# Patient Record
Sex: FEMALE | Race: WHITE | NOT HISPANIC OR LATINO | Employment: UNEMPLOYED | URBAN - NONMETROPOLITAN AREA
[De-identification: names, ages, dates, MRNs, and addresses within clinical notes are randomized per-mention and may not be internally consistent; named-entity substitution may affect disease eponyms.]

---

## 2017-02-01 ENCOUNTER — OFFICE VISIT (OUTPATIENT)
Dept: PSYCHIATRY | Facility: CLINIC | Age: 32
End: 2017-02-01

## 2017-02-01 DIAGNOSIS — F19.20 MEDICATION ADDICTION, CONTINUOUS (HCC): ICD-10-CM

## 2017-02-01 DIAGNOSIS — F17.200 TOBACCO USE DISORDER: ICD-10-CM

## 2017-02-01 DIAGNOSIS — F10.11 ALCOHOL ABUSE, IN REMISSION: ICD-10-CM

## 2017-02-01 DIAGNOSIS — F19.10 SUBSTANCE ABUSE (HCC): ICD-10-CM

## 2017-02-01 DIAGNOSIS — F12.10 MILD CANNABIS USE DISORDER: ICD-10-CM

## 2017-02-01 DIAGNOSIS — F11.20 OPIOID USE DISORDER, MODERATE, DEPENDENCE (HCC): Primary | ICD-10-CM

## 2017-02-01 LAB
AMPHETAMINE CUT-OFF: ABNORMAL
BENZODIAZIPINE CUT-OFF: ABNORMAL
BUPRENORPHINE CUT-OFF: ABNORMAL
COCAINE CUT-OFF: ABNORMAL
EXTERNAL AMPHETAMINE SCREEN URINE: NEGATIVE
EXTERNAL BENZODIAZEPINE SCREEN URINE: NEGATIVE
EXTERNAL BUPRENORPHINE SCREEN URINE: POSITIVE
EXTERNAL COCAINE SCREEN URINE: NEGATIVE
EXTERNAL MDMA: NEGATIVE
EXTERNAL METHADONE SCREEN URINE: NEGATIVE
EXTERNAL METHAMPHETAMINE SCREEN URINE: NEGATIVE
EXTERNAL OPIATES SCREEN URINE: POSITIVE
EXTERNAL OXYCODONE SCREEN URINE: NEGATIVE
EXTERNAL THC SCREEN URINE: NEGATIVE
MDMA CUT-OFF: ABNORMAL
METHADONE CUT-OFF: ABNORMAL
METHAMPHETAMINE CUT-OFF: ABNORMAL
OPIATES CUT-OFF: ABNORMAL
OXYCODONE CUT-OFF: ABNORMAL
THC CUT-OFF: ABNORMAL

## 2017-02-01 PROCEDURE — 90837 PSYTX W PT 60 MINUTES: CPT | Performed by: COUNSELOR

## 2017-02-01 NOTE — TREATMENT PLAN
DATE: 02/01/17  Long Term Goal: Maintain sobriety and develop recovery skills to improve her level of functioning                                                                                                                                                                                Patient Care Needs Objectives Target Date Extend Date Date Met Interventions ResponsibleTeam Member    Relapse Risk: Patient presents with Opioid, Cannabis, and Medication Dependencies and is at high risk for relapse.  Patient has long history of substance use with minimal periods of abstinence.                                                                         1.Patient to display an increase in DASES score indicating increased confidence in her ability to maintain abstinence.    a.Identify high risk people, places, and situations that must be avoided or managed to prevent relapse.    b.Identify specific recovery action steps she can take when faced with high risk situations.    c.Attend group sessions as scheduled and participate by giving and receiving feedback.    d.Develop a positive network of support by attending a minimum of three 12 step support groups weekly and obtain a sponsor.    e.Write a personal recovery plan and share it with the group prior to discharge.    f.Identify healthy coping strategies to manage difficult emotions without .using drugs or alcohol.    g. Complete a Personal Journal exploring addiction and personal issues relating to use/relapse prior to discharge                   04/17/17   1a. Educate patient about the disease concept and relapse prevention skills.  b. Provide Narcotics Anonymous book and other recovery literature.  c. Provide random alcohol and drug screening.  d. IOP groups provided M,W, Th 8:30-11:30 AM.  e. Introduce patient to 12-step recovery groups and encourage her to get a sponsor.  f. Assist patient in the development of a personal recovery plan.  g. Teach cognitive  behavioral techniques to dispute irrational beliefs.  h. . Family is invited to attend family education sessions every Tuesday.  i. Provide progress reports as needed.                                       MD Signature      Date/ Time         Therapist Signature    Date/Time      RN Signature    Date/Time               Discharge Criteria Plan: Patient to complete treatment objectives while displaying regular attendance and negative drug/alcohol screens.     I have discussed and reviewed this treatment plan with the patient.  It has been printed for signatures.
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
## 2017-02-06 ENCOUNTER — LAB (OUTPATIENT)
Dept: LAB | Facility: HOSPITAL | Age: 32
End: 2017-02-06
Attending: PSYCHIATRY & NEUROLOGY

## 2017-02-06 ENCOUNTER — OFFICE VISIT (OUTPATIENT)
Dept: PSYCHIATRY | Facility: HOSPITAL | Age: 32
End: 2017-02-06

## 2017-02-06 DIAGNOSIS — F17.200 TOBACCO USE DISORDER: ICD-10-CM

## 2017-02-06 DIAGNOSIS — F10.10 ALCOHOL ABUSE, CONTINUOUS: ICD-10-CM

## 2017-02-06 DIAGNOSIS — F11.20 OPIOID TYPE DEPENDENCE, UNSPECIFIED: Primary | ICD-10-CM

## 2017-02-06 DIAGNOSIS — F12.10 MILD CANNABIS USE DISORDER: ICD-10-CM

## 2017-02-06 DIAGNOSIS — F11.20 OPIOID USE DISORDER, MODERATE, DEPENDENCE (HCC): Primary | ICD-10-CM

## 2017-02-06 DIAGNOSIS — F10.11 ALCOHOL ABUSE, IN REMISSION: ICD-10-CM

## 2017-02-06 DIAGNOSIS — F11.20 OPIOID TYPE DEPENDENCE, UNSPECIFIED: ICD-10-CM

## 2017-02-06 DIAGNOSIS — F12.10 CANNABIS ABUSE, UNSPECIFIED: ICD-10-CM

## 2017-02-06 LAB
AMPHET+METHAMPHET UR QL: NEGATIVE
BARBITURATES UR QL SCN: NEGATIVE
BENZODIAZ UR QL SCN: NEGATIVE
BUPRENORPHINE+NOR UR QL SCN: NEGATIVE
CANNABINOIDS SERPL QL: NEGATIVE
COCAINE UR QL: NEGATIVE
METHADONE UR QL SCN: NEGATIVE
OPIATES UR QL: NEGATIVE
OXYCODONE UR QL SCN: NEGATIVE
PCP UR QL SCN: NEGATIVE
PROPOXYPH UR QL: NEGATIVE

## 2017-02-06 PROCEDURE — 80307 DRUG TEST PRSMV CHEM ANLYZR: CPT | Performed by: PSYCHIATRY & NEUROLOGY

## 2017-02-06 PROCEDURE — G0480 DRUG TEST DEF 1-7 CLASSES: HCPCS | Performed by: PSYCHIATRY & NEUROLOGY

## 2017-02-06 PROCEDURE — H0015 ALCOHOL AND/OR DRUG SERVICES: HCPCS | Performed by: COUNSELOR

## 2017-02-06 PROCEDURE — 99203 OFFICE O/P NEW LOW 30 MIN: CPT | Performed by: PSYCHIATRY & NEUROLOGY

## 2017-02-06 NOTE — PROGRESS NOTES
The patient is seen today for an intake assessment for the IOP program. Reviewed and agreed with the findings in the note done by Aleta Mckenzie  on date 2/1/17.  Patient will be admitted to the [facility] Phase I. Patient will be provided individual and group counseling and monitored closely for sobriety. Patient will be encouraged to learn and practice healthy coping skills and to maintain sobriety. Patient will participate in group therapy on Monday, Wednesdays and Thursdays from 8:30 am to 11:30 am for approximately 8-10 weeks. The clinical focus of treatment will be adding coping skills to prevent relapse and to manage moods. Patient will be assisted with coping skills to also manage triggers, cravings. Patient will be provided with psychoeducation surrounding substance misuse and any other behavioral health concerns present during treatment. Patient has been informed of the requirement to attend 12 step group meetings and to obtain a sponsor.  Patient informed of requirement of drug screenings at each contact to ensure compliance and reinforce abstinence from all illicit drugs and alcohol.  Patient was encouraged to involve family in the family education program which will be offered weekly. Patient will meet with the staff psychiatrist on a biweekly basis for medical monitoring and, if needed, medication management. Patient will be given the option to see the medical provider each week, if needed. Patient will be assisted with development of a personal recovery plan.    Patient's intake diagnosis   Encounter Diagnoses   Name Primary?   • Opioid use disorder, moderate, dependence Yes   • Mild cannabis use disorder    • Tobacco use disorder    • Alcohol abuse, in remission      Drug Screen Reviewed

## 2017-02-07 PROCEDURE — 36415 COLL VENOUS BLD VENIPUNCTURE: CPT

## 2017-02-07 PROCEDURE — 80171 DRUG SCREEN QUANT GABAPENTIN: CPT | Performed by: PSYCHIATRY & NEUROLOGY

## 2017-02-07 NOTE — PROGRESS NOTES
"NAME: Elena Joshi  Date:02/06/17  8:30 AM - 11:30 AM   IOP GROUP NOTE      DATA: 3 hour IOP group therapy session (topics addressed): Selfishness and Addiction, Supporting Recovery, Listening/Communication Skills  Hour 1: Therapist facilitated discussion of the daily motivation passage from the “Just For Today, Daily Meditations for Recovering Addicts” ([employer] (1991). Therapist continued facilitation of rapport building strategies between group members. Therapist asked that each patient check in with home life and recovery efforts and identify triggers, cravings, and high risk situations that arise between group sessions. \"We had convinced ourselves that we can make it alone and proceeded to live life on that basis. The results were disastrous and, in the end , each of us had to admit that self sufficiency was a lie.\" Basic text, page. 62      Hour 2: Member provided support for another group member as he shared his relapse prevention plan for phase up ceremony. Member shared positive coping skills and strategies to encourage the member as he moves through phase II IOP.      Hour 3: Therapist facilitated a group distention on listening and communication skills in a group setting which includes waiting and listening. Members shared their experiences on why they felt it was important to interject their thoughts at certain times within the group process and Simpson it was difficult to stay on topic and especially if they've been involved with the department of corrections system. Therapist provided empathy and support during group meeting    Patient presents to [facility] the [facility] time.  Patient appears to be resistant to the idea of treatment and house it presents to her personally.  Patient was resistant to acceptance of addiction.  She was not willing to call herself an addict.  Patient states that she does not have a sobriety date.  That she does not have any clean and sober dates and denies that she " "does not have any barriers to sobriety.  She did not complete her checking sheet.  Please refer to the history of present illness and to the substance abuse screening.  When discussing the characteristics of addiction, patient was able to identify the characteristics as they apply to others however she was not able to apply them to herself.  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  Therapist provided challenging situations within the therapeutic environment and group members challenged her ideas of addiction and encouraged her to explore her on thoughts and perceptions of the sentences self.  They encouraged her to go to support groups in order to normalize her thoughts and behaviors.  They also challenged the consequences of her drug use such as her loss of job and resulting legal problems as evidence that her history of drug use is.  Before leaving the group session, patient was able to reluctantly state that she \"might be an addict.'  Patient saw Dr. Bah for staffing and was admitted for IOP.      ASSESSMENT: UDS - normal/Labs pending      Hygiene: good  Cooperation: Evasive  Eye Contact: Good  Affect: Flat/blunted  Speech: Normal  Thought Progress: Goal directed  Thought Content: Normal  Suicidal: None  Homicidal: None  Orientation: Person, Place, Time and Situation  Reliability: poor  Insight: poor  Judgement: Poor  Impulse Control: Fair  Physical/Medical Issues: Yes - See HPI      Family issues related to recovery: Limited support from family    12-Step attendance: yes  Frequency: 2 times weekly      Sponsor: Yes, Looking for one (has a temporary one)  Frequency: Yes, Whenever possilbe      Identification of environmental and personal barriers to recovery: coping with limited emotions, remorse, guilt, work, family, overall understanding of disease of addiction, legal issues,   Motivation for treatment: " Healthy lifestyle, recovery, healthy relationships, resolving legal issues      RESPONSE: Patient presented to group on time,       CLINICAL MANUVERING/IProgram Assignments: Personal Journal, attendance of 12-step meetings, drug       INTERVENTIONS: CBT and group interaction activites utilized to stress relapse recovery/prevention. Patient to actively participate in activity, engage verbally with peers and staff, display an appropriate affect, keep conversation appropriate to topic, and display no negative or impulsive behaviors. Member was encouraged to bring family members for educational group on Wednesdays and Thursday. Member was provided with encouragement and unconditional positive regard. Member was encouraged to continue participation with 12-step meetings and maintaining positive daily choices.       DASES SCORE: Pending  INTAKE URICA BASELINE:  Pending  AUDIT:  1  DAST: Pending      PLAN: Continue [facility] IOP Phase I.       Aftercare: [facility] IOP Phase II      Program Assignments: Personal Journal, attendance of 12-step meetings, drug screens

## 2017-02-08 ENCOUNTER — OFFICE VISIT (OUTPATIENT)
Dept: PSYCHIATRY | Facility: HOSPITAL | Age: 32
End: 2017-02-08

## 2017-02-08 DIAGNOSIS — F10.10 ALCOHOL ABUSE, CONTINUOUS: ICD-10-CM

## 2017-02-08 DIAGNOSIS — F17.200 TOBACCO USE DISORDER: ICD-10-CM

## 2017-02-08 DIAGNOSIS — F11.20 OPIOID TYPE DEPENDENCE, UNSPECIFIED: Primary | ICD-10-CM

## 2017-02-08 DIAGNOSIS — F12.10 CANNABIS ABUSE, UNSPECIFIED: ICD-10-CM

## 2017-02-08 LAB — ETHANOL UR-MCNC: NEGATIVE %

## 2017-02-08 PROCEDURE — H0015 ALCOHOL AND/OR DRUG SERVICES: HCPCS | Performed by: COUNSELOR

## 2017-02-09 ENCOUNTER — LAB (OUTPATIENT)
Dept: LAB | Facility: HOSPITAL | Age: 32
End: 2017-02-09
Attending: PSYCHIATRY & NEUROLOGY

## 2017-02-09 ENCOUNTER — OFFICE VISIT (OUTPATIENT)
Dept: PSYCHIATRY | Facility: HOSPITAL | Age: 32
End: 2017-02-09

## 2017-02-09 DIAGNOSIS — F11.20 OPIOID TYPE DEPENDENCE, UNSPECIFIED: Primary | ICD-10-CM

## 2017-02-09 DIAGNOSIS — F10.11 ALCOHOL ABUSE, IN REMISSION: ICD-10-CM

## 2017-02-09 DIAGNOSIS — F12.10 CANNABIS ABUSE, UNSPECIFIED: ICD-10-CM

## 2017-02-09 DIAGNOSIS — F11.20 OPIOID TYPE DEPENDENCE, UNSPECIFIED: ICD-10-CM

## 2017-02-09 DIAGNOSIS — F17.200 TOBACCO USE DISORDER: ICD-10-CM

## 2017-02-09 DIAGNOSIS — F12.10 MILD CANNABIS USE DISORDER: ICD-10-CM

## 2017-02-09 DIAGNOSIS — F10.10 ALCOHOL ABUSE, CONTINUOUS: ICD-10-CM

## 2017-02-09 LAB
AMPHET+METHAMPHET UR QL: NEGATIVE
BARBITURATES UR QL SCN: NEGATIVE
BENZODIAZ UR QL SCN: NEGATIVE
BUPRENORPHINE+NOR UR QL SCN: NEGATIVE
CANNABINOIDS SERPL QL: NEGATIVE
COCAINE UR QL: NEGATIVE
GABAPENTIN SERPLBLD-MCNC: NORMAL UG/ML
METHADONE UR QL SCN: NEGATIVE
MITRAGYNINE+7-OH UR QL CFM: NEGATIVE
OPIATES UR QL: NEGATIVE
OXYCODONE UR QL SCN: NEGATIVE
PCP UR QL SCN: NEGATIVE
PROPOXYPH UR QL: NEGATIVE
SPECIMEN STATUS: NORMAL

## 2017-02-09 PROCEDURE — 80307 DRUG TEST PRSMV CHEM ANLYZR: CPT | Performed by: PSYCHIATRY & NEUROLOGY

## 2017-02-09 PROCEDURE — H0015 ALCOHOL AND/OR DRUG SERVICES: HCPCS | Performed by: COUNSELOR

## 2017-02-09 PROCEDURE — G0480 DRUG TEST DEF 1-7 CLASSES: HCPCS | Performed by: PSYCHIATRY & NEUROLOGY

## 2017-02-09 NOTE — PROGRESS NOTES
"NAME: Elena Joshi  Date:02/08/17  8:30 AM - 11:30 AM   IOP GROUP NOTE      DATA: 3 hour IOP group therapy session (topics addressed): Asking for help, Radical Acceptance  Hour 1: Therapist facilitated discussion of the daily motivation passage from the “Just For Today, Daily Meditations for Recovering Addicts” ([employer] (1991). Therapist continued facilitation of rapport building strategies between group members. Therapist asked that each patient check in with home life and recovery efforts and identify triggers, cravings, and high risk situations that arise between group sessions. \"... an NA sponsor is a member of Narcotics Anonymous, living our program of recovery, he was willing to build a special, supportive, one-on-1 relationship with us.\" IP No. 11, Sponsorship, Revised      Hour 2:  Therapist facilitated a group discussion on coping with distress and pain whether it be physical and emotional or mental. Grew participants explored how they struggle with her overwhelming thoughts and feelings and unhealthy and excessive always which led to their addictive patterns of behaviors. Individuals identified specific examples of traumas that cause them extreme pain which lead to dangerous behaviors such as suicide, rape, accidents, chronic/terminal illness, rejection, failure, mental health, poor regulation, bullying, and financial stress,      Hour 3: Therapist facilitated a group discussion on radical acceptance group members to find it as an extreme way of recognizing that an event happened and being able to move on with her life. Members were able to identify different coping skills that addressed specific ways of distracting their thoughts by addressing task and chores, counting, using their sense of smell using their sense of vision using their sense of taste their touch, relaxation found vision hearing and other various specific ways of giving respect to these events and their lives without apology. " "    Patient reported to [facility] as scheduled.  Her opening statement was \"I have nothing to say.\"  She indicates that she didn't feel good and when therapist probed for more information she indicates that she was going through some withdrawal symptoms such as she's reports poor sleep feeling overwhelmed feeling restless leg hot flashes irritable and diarrhea.  She indicates that she has taken Imodium to help with the diarrhea.  Patient was able to state that she was an addict after group process.    ASSESSMENT: UDS - normal/Labs pending      Hygiene: good  Cooperation: Resistant to change (moderate)  Eye Contact: Good  Affect: Flat/blunted (labile)  Speech: Normal  Thought Progress: Goal directed  Thought Content: Normal  Suicidal: None  Homicidal: None  Orientation: Person, Place, Time and Situation  Reliability: poor  Insight: poor  Judgement: Poor  Impulse Control: Fair  Physical/Medical Issues: Yes - See HPI      Family issues related to recovery: Limited support from family    12-Step attendance: yes  Frequency: 2 times weekly      Sponsor: Yes, Looking for one (has a temporary one)  Frequency: Yes, Whenever possilbe      Identification of environmental and personal barriers to recovery: coping with limited emotions, remorse, guilt, work, family, overall understanding of disease of addiction, legal issues,   Motivation for treatment: Healthy lifestyle, recovery, healthy relationships, resolving legal issues      RESPONSE: Patient presented to group on time,       CLINICAL MANUVERING/IProgram Assignments: Personal Journal, attendance of 12-step meetings, drug       INTERVENTIONS: CBT and group interaction activites utilized to stress relapse recovery/prevention. Patient to actively participate in activity, engage verbally with peers and staff, display an appropriate affect, keep conversation appropriate to topic, and display no negative or impulsive behaviors. Member was encouraged to bring family members for " educational group on Wednesdays and Thursday. Member was provided with encouragement and unconditional positive regard. Member was encouraged to continue participation with 12-step meetings and maintaining positive daily choices.       DASES SCORE: Pending  INTAKE URICA BASELINE: Pending  AUDIT: 1  DAST: Pending      PLAN: Continue [facility] IOP Phase I.       Aftercare: [facility] IOP Phase II      Program Assignments: Personal Journal, attendance of 12-step meetings, drug screens

## 2017-02-09 NOTE — PROGRESS NOTES
"NAME: Elena Joshi   Date:02/09/17  8:30 AM - 11:30 AM   IOP GROUP NOTE      DATA: 3 hour IOP group therapy session (topics addressed): Acceptance, Roles We Play (Sober/Addicts), NA Meeting  Hour 1: Therapist facilitated discussion of the daily motivation passage from the “Just For Today, Daily Meditations for Recovering Addicts” ([employer] (1991). Therapist continued facilitation of rapport building strategies between group members. Therapist asked that each patient check in with home life and recovery efforts and identify triggers, cravings, and high risk situations that arise between group sessions. \"When we accept ourselves, we can accept others into our lives, unconditionally, probably for the first time.\" IP No. 19, Self-Acceptance.      Hour 2:  Therapist facilitated a group discussion on tying together the roles and self-acceptance before addiction, early recovery, and as an sober adult.     Hour 3: Margarita Higuera, hospital volunteer, was not able to facilitate the AA meeting as normally scheduled. Members took charge and facilitated their own member and provided feedback and shared current sobriety threatening issues with group. Members discussed barriers and benefits of attending 12 step meetings. Therapist provided empathy and support during group meeting.    Patient stated that she is not feeling good and that her legs have been bothering her really bad (restless leg). Patient reported feeling hateful and irritable, not being able to sleep (insomnia), and having diarrhea and hot flashes. Patient reported that her cravings were an 8 (on a scale of 10).     ASSESSMENT: UDS - normal/Labs pending      Hygiene: good  Cooperation: Resistant to change (moderate)  Eye Contact: Good  Affect: Flat/blunted (labile)  Speech: Normal  Thought Progress: Goal directed  Thought Content: Normal  Suicidal: None  Homicidal: None  Orientation: Person, Place, Time and Situation  Reliability: poor  Insight: " poor  Judgement: Poor  Impulse Control: Fair  Physical/Medical Issues: Yes - See HPI      Family issues related to recovery: Limited support from family    12-Step attendance: yes  Frequency: 2 times weekly      Sponsor: Yes, Looking for one (has a temporary one)  Frequency: Yes, Whenever possilbe      Identification of environmental and personal barriers to recovery: coping with limited emotions, remorse, guilt, work, family, overall understanding of disease of addiction, legal issues,   Motivation for treatment: Healthy lifestyle, recovery, healthy relationships, resolving legal issues      RESPONSE: Patient presented to group on time,       CLINICAL MANUVERING/IProgram Assignments: Personal Journal, attendance of 12-step meetings, drug       INTERVENTIONS: CBT and group interaction activites utilized to stress relapse recovery/prevention. Patient to actively participate in activity, engage verbally with peers and staff, display an appropriate affect, keep conversation appropriate to topic, and display no negative or impulsive behaviors. Member was encouraged to bring family members for educational group on Wednesdays and Thursday. Member was provided with encouragement and unconditional positive regard. Member was encouraged to continue participation with 12-step meetings and maintaining positive daily choices.       DASES SCORE: Pending  INTAKE URICA BASELINE: Pending  AUDIT: 1  DAST: Pending      PLAN: Continue [facility] IOP Phase I.       Aftercare: [facility] IOP Phase II      Program Assignments: Personal Journal, attendance of 12-step meetings, drug screens

## 2017-02-11 LAB — ETHANOL UR-MCNC: NEGATIVE %

## 2017-02-12 LAB — MITRAGYNINE+7-OH UR QL CFM: NEGATIVE

## 2017-02-13 ENCOUNTER — DOCUMENTATION (OUTPATIENT)
Dept: PSYCHIATRY | Facility: HOSPITAL | Age: 32
End: 2017-02-13

## 2017-02-13 ENCOUNTER — LAB (OUTPATIENT)
Dept: LAB | Facility: HOSPITAL | Age: 32
End: 2017-02-13
Attending: PSYCHIATRY & NEUROLOGY

## 2017-02-13 DIAGNOSIS — F10.11 ALCOHOL ABUSE, IN REMISSION: ICD-10-CM

## 2017-02-13 DIAGNOSIS — F12.10 MILD CANNABIS USE DISORDER: ICD-10-CM

## 2017-02-13 DIAGNOSIS — F17.200 TOBACCO USE DISORDER: ICD-10-CM

## 2017-02-13 DIAGNOSIS — F11.20 OPIOID TYPE DEPENDENCE, UNSPECIFIED: ICD-10-CM

## 2017-02-13 NOTE — PROGRESS NOTES
NOTE:  Participant was called at 2:22 pm and informed that if she is not registered by 3:00 pm she will be considered dirty.

## 2017-02-14 ENCOUNTER — LAB (OUTPATIENT)
Dept: LAB | Facility: HOSPITAL | Age: 32
End: 2017-02-14
Attending: PSYCHIATRY & NEUROLOGY

## 2017-02-14 DIAGNOSIS — F11.20 OPIOID TYPE DEPENDENCE, UNSPECIFIED: ICD-10-CM

## 2017-02-14 DIAGNOSIS — F10.11 ALCOHOL ABUSE, IN REMISSION: ICD-10-CM

## 2017-02-14 DIAGNOSIS — F17.200 TOBACCO USE DISORDER: ICD-10-CM

## 2017-02-14 DIAGNOSIS — F12.10 MILD CANNABIS USE DISORDER: ICD-10-CM

## 2017-02-14 PROCEDURE — 80307 DRUG TEST PRSMV CHEM ANLYZR: CPT | Performed by: PSYCHIATRY & NEUROLOGY

## 2017-02-14 PROCEDURE — G0480 DRUG TEST DEF 1-7 CLASSES: HCPCS | Performed by: PSYCHIATRY & NEUROLOGY

## 2017-02-15 ENCOUNTER — LAB (OUTPATIENT)
Dept: LAB | Facility: HOSPITAL | Age: 32
End: 2017-02-15
Attending: PSYCHIATRY & NEUROLOGY

## 2017-02-15 ENCOUNTER — OFFICE VISIT (OUTPATIENT)
Dept: PSYCHIATRY | Facility: HOSPITAL | Age: 32
End: 2017-02-15

## 2017-02-15 DIAGNOSIS — F12.10 CANNABIS ABUSE, UNSPECIFIED: ICD-10-CM

## 2017-02-15 DIAGNOSIS — F17.210 CIGARETTE SMOKER: ICD-10-CM

## 2017-02-15 DIAGNOSIS — F10.11 ALCOHOL ABUSE, IN REMISSION: ICD-10-CM

## 2017-02-15 DIAGNOSIS — F17.200 TOBACCO USE DISORDER: ICD-10-CM

## 2017-02-15 DIAGNOSIS — F10.10 ALCOHOL ABUSE, CONTINUOUS: ICD-10-CM

## 2017-02-15 DIAGNOSIS — F12.10 MILD CANNABIS USE DISORDER: ICD-10-CM

## 2017-02-15 DIAGNOSIS — F11.20 OPIOID TYPE DEPENDENCE, UNSPECIFIED: ICD-10-CM

## 2017-02-15 DIAGNOSIS — F11.20 OPIOID TYPE DEPENDENCE, UNSPECIFIED: Primary | ICD-10-CM

## 2017-02-15 PROCEDURE — 80307 DRUG TEST PRSMV CHEM ANLYZR: CPT | Performed by: PSYCHIATRY & NEUROLOGY

## 2017-02-15 PROCEDURE — 36415 COLL VENOUS BLD VENIPUNCTURE: CPT

## 2017-02-15 PROCEDURE — G0480 DRUG TEST DEF 1-7 CLASSES: HCPCS | Performed by: PSYCHIATRY & NEUROLOGY

## 2017-02-15 PROCEDURE — H0015 ALCOHOL AND/OR DRUG SERVICES: HCPCS | Performed by: COUNSELOR

## 2017-02-15 PROCEDURE — 80171 DRUG SCREEN QUANT GABAPENTIN: CPT | Performed by: PSYCHIATRY & NEUROLOGY

## 2017-02-16 ENCOUNTER — APPOINTMENT (OUTPATIENT)
Dept: PSYCHIATRY | Facility: HOSPITAL | Age: 32
End: 2017-02-16

## 2017-02-16 ENCOUNTER — LAB (OUTPATIENT)
Dept: LAB | Facility: HOSPITAL | Age: 32
End: 2017-02-16
Attending: PSYCHIATRY & NEUROLOGY

## 2017-02-16 DIAGNOSIS — F10.11 ALCOHOL ABUSE, IN REMISSION: ICD-10-CM

## 2017-02-16 DIAGNOSIS — F17.200 TOBACCO USE DISORDER: ICD-10-CM

## 2017-02-16 DIAGNOSIS — F12.10 MILD CANNABIS USE DISORDER: ICD-10-CM

## 2017-02-16 DIAGNOSIS — F10.10 ALCOHOL ABUSE, CONTINUOUS: ICD-10-CM

## 2017-02-16 DIAGNOSIS — F11.20 OPIOID TYPE DEPENDENCE, UNSPECIFIED: Primary | ICD-10-CM

## 2017-02-16 DIAGNOSIS — F17.210 CIGARETTE SMOKER: ICD-10-CM

## 2017-02-16 DIAGNOSIS — F11.20 OPIOID TYPE DEPENDENCE, UNSPECIFIED: ICD-10-CM

## 2017-02-16 DIAGNOSIS — F12.10 CANNABIS ABUSE, UNSPECIFIED: ICD-10-CM

## 2017-02-16 LAB
AMPHET+METHAMPHET UR QL: NEGATIVE
BARBITURATES UR QL SCN: NEGATIVE
BENZODIAZ UR QL SCN: NEGATIVE
BUPRENORPHINE+NOR UR QL SCN: NEGATIVE
CANNABINOIDS SERPL QL: NEGATIVE
COCAINE UR QL: NEGATIVE
ETHANOL UR-MCNC: NEGATIVE %
ETHANOL UR-MCNC: NEGATIVE %
METHADONE UR QL SCN: NEGATIVE
OPIATES UR QL: NEGATIVE
OXYCODONE UR QL SCN: NEGATIVE
PCP UR QL SCN: NEGATIVE
PROPOXYPH UR QL: NEGATIVE

## 2017-02-16 PROCEDURE — 80307 DRUG TEST PRSMV CHEM ANLYZR: CPT | Performed by: PSYCHIATRY & NEUROLOGY

## 2017-02-16 PROCEDURE — G0480 DRUG TEST DEF 1-7 CLASSES: HCPCS | Performed by: PSYCHIATRY & NEUROLOGY

## 2017-02-16 PROCEDURE — H0015 ALCOHOL AND/OR DRUG SERVICES: HCPCS | Performed by: COUNSELOR

## 2017-02-16 NOTE — PROGRESS NOTES
"NAME: Elena Joshi  Date:02/16/17  8:30 AM - 11:30 AM   IOP GROUP NOTE      DATA: 3 hour IOP group therapy session (topics addressed): Check ins and group discussion on their thoughts about the My Name Is Atif JEREZ. richard.  Hour 1: Therapist facilitated discussion of the daily motivation passage from the “Just For Today, Daily Meditations for Recovering Addicts” ([employer] (1991). Therapist continued facilitation of rapport building strategies between group members. Therapist asked that each patient check in with home life and recovery efforts and identify triggers, cravings, and high risk situations that arise between group sessions. \"when we refuse to accept the reality of today, we are denying jeannie in our Higher Power. This can only bring more suffering.\" IP No. 8, Just for Today      Hour 2: Benito, The recreation therapist, came to take the group to the gym, but there were others already using the gym, so he took back to the group and played \"Liar's Dice.\"       Hour 3: Continued to watch the movie My Name is Atif JEREZ, and Margarita Higuera, [facility] volunteer, came and facilitated the AA Meeting as normally scheduled.     Patient shared that this morning, she is really feeling withdrawal symptoms and that her little girl even asked why she was being so hateful and that made her feel bad. Patient has been prescribed Naltraxone but is hesitant to start because of side effects.    ASSESSMENT: normal  Hygiene: good  Cooperation: Resistant to change (moderate)  Eye Contact: Good  Affect: Flat/blunted (labile)  Speech: Normal  Thought Progress: Goal directed  Thought Content: Normal  Suicidal: None  Homicidal: None  Orientation: Person, Place, Time and Situation  Reliability: poor  Insight: poor  Judgement: Poor  Impulse Control: Fair  Physical/Medical Issues: Yes - See HPI      Family issues related to recovery: Limited support from family    12-Step attendance: yes  Frequency: 2 times weekly      Sponsor: Yes, " Looking for one (has a temporary one)  Frequency: Yes, Whenever possilbe      Identification of environmental and personal barriers to recovery: coping with limited emotions, remorse, guilt, work, family, overall understanding of disease of addiction, legal issues,   Motivation for treatment: Healthy lifestyle, recovery, healthy relationships, resolving legal issues      RESPONSE: Patient presented to group on time,       CLINICAL MANUVERING/IProgram Assignments: Personal Journal, attendance of 12-step meetings, drug       INTERVENTIONS: CBT and group interaction activites utilized to stress relapse recovery/prevention. Patient to actively participate in activity, engage verbally with peers and staff, display an appropriate affect, keep conversation appropriate to topic, and display no negative or impulsive behaviors. Member was encouraged to bring family members for educational group on Wednesdays and Thursday. Member was provided with encouragement and unconditional positive regard. Member was encouraged to continue participation with 12-step meetings and maintaining positive daily choices.       DASES SCORE: 85  INTAKE URICA BASELINE: 12 - Preparation stage of change  AUDIT: 1  DAST: 8      PLAN: Continue [facility] IOP Phase I.       Aftercare: [facility] IOP Phase II      Program Assignments: Personal Journal, attendance of 12-step meetings, drug screens
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
## 2017-02-17 LAB
ETHANOL UR-MCNC: NEGATIVE %
GABAPENTIN SERPLBLD-MCNC: NORMAL UG/ML (ref 4–16)

## 2017-02-18 LAB
MITRAGYNINE+7-OH UR QL CFM: NEGATIVE
MITRAGYNINE+7-OH UR QL CFM: NEGATIVE

## 2017-02-19 LAB — MITRAGYNINE+7-OH UR QL CFM: NEGATIVE

## 2017-02-20 ENCOUNTER — LAB (OUTPATIENT)
Dept: LAB | Facility: HOSPITAL | Age: 32
End: 2017-02-20
Attending: PSYCHIATRY & NEUROLOGY

## 2017-02-20 ENCOUNTER — OFFICE VISIT (OUTPATIENT)
Dept: PSYCHIATRY | Facility: HOSPITAL | Age: 32
End: 2017-02-20

## 2017-02-20 DIAGNOSIS — F17.210 CIGARETTE SMOKER: ICD-10-CM

## 2017-02-20 DIAGNOSIS — F11.20 OPIOID TYPE DEPENDENCE, UNSPECIFIED: Primary | ICD-10-CM

## 2017-02-20 DIAGNOSIS — F10.10 ALCOHOL ABUSE, CONTINUOUS: ICD-10-CM

## 2017-02-20 DIAGNOSIS — F12.10 MILD CANNABIS USE DISORDER: ICD-10-CM

## 2017-02-20 DIAGNOSIS — F11.20 OPIOID TYPE DEPENDENCE, UNSPECIFIED: ICD-10-CM

## 2017-02-20 DIAGNOSIS — F17.200 TOBACCO USE DISORDER: ICD-10-CM

## 2017-02-20 DIAGNOSIS — F10.11 ALCOHOL ABUSE, IN REMISSION: ICD-10-CM

## 2017-02-20 DIAGNOSIS — F12.10 CANNABIS ABUSE, UNSPECIFIED: ICD-10-CM

## 2017-02-20 PROCEDURE — H0015 ALCOHOL AND/OR DRUG SERVICES: HCPCS | Performed by: COUNSELOR

## 2017-02-20 PROCEDURE — 80307 DRUG TEST PRSMV CHEM ANLYZR: CPT | Performed by: PSYCHIATRY & NEUROLOGY

## 2017-02-20 PROCEDURE — G0480 DRUG TEST DEF 1-7 CLASSES: HCPCS | Performed by: PSYCHIATRY & NEUROLOGY

## 2017-02-21 LAB — ETHANOL UR-MCNC: NEGATIVE %
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
## 2017-02-22 ENCOUNTER — LAB (OUTPATIENT)
Dept: LAB | Facility: HOSPITAL | Age: 32
End: 2017-02-22
Attending: PSYCHIATRY & NEUROLOGY

## 2017-02-22 ENCOUNTER — OFFICE VISIT (OUTPATIENT)
Dept: PSYCHIATRY | Facility: HOSPITAL | Age: 32
End: 2017-02-22

## 2017-02-22 DIAGNOSIS — F17.200 TOBACCO USE DISORDER: ICD-10-CM

## 2017-02-22 DIAGNOSIS — F11.20 OPIOID TYPE DEPENDENCE, UNSPECIFIED: Primary | ICD-10-CM

## 2017-02-22 DIAGNOSIS — F11.20 OPIOID TYPE DEPENDENCE, UNSPECIFIED: ICD-10-CM

## 2017-02-22 DIAGNOSIS — F12.10 MILD CANNABIS USE DISORDER: ICD-10-CM

## 2017-02-22 DIAGNOSIS — F12.10 CANNABIS ABUSE, UNSPECIFIED: ICD-10-CM

## 2017-02-22 DIAGNOSIS — F10.10 ALCOHOL ABUSE, CONTINUOUS: ICD-10-CM

## 2017-02-22 DIAGNOSIS — F13.10 SEDATIVE, HYPNOTIC OR ANXIOLYTIC ABUSE, UNSPECIFIED: ICD-10-CM

## 2017-02-22 DIAGNOSIS — F10.11 ALCOHOL ABUSE, IN REMISSION: ICD-10-CM

## 2017-02-22 LAB
AMPHET+METHAMPHET UR QL: NEGATIVE
BARBITURATES UR QL SCN: NEGATIVE
BENZODIAZ UR QL SCN: POSITIVE
BUPRENORPHINE+NOR UR QL SCN: NEGATIVE
CANNABINOIDS SERPL QL: NEGATIVE
COCAINE UR QL: NEGATIVE
METHADONE UR QL SCN: NEGATIVE
OPIATES UR QL: NEGATIVE
OXYCODONE UR QL SCN: NEGATIVE
PCP UR QL SCN: NEGATIVE
PROPOXYPH UR QL: NEGATIVE

## 2017-02-22 PROCEDURE — 80307 DRUG TEST PRSMV CHEM ANLYZR: CPT | Performed by: PSYCHIATRY & NEUROLOGY

## 2017-02-22 PROCEDURE — 99213 OFFICE O/P EST LOW 20 MIN: CPT | Performed by: PSYCHIATRY & NEUROLOGY

## 2017-02-22 PROCEDURE — G0480 DRUG TEST DEF 1-7 CLASSES: HCPCS | Performed by: PSYCHIATRY & NEUROLOGY

## 2017-02-22 PROCEDURE — H0015 ALCOHOL AND/OR DRUG SERVICES: HCPCS | Performed by: COUNSELOR

## 2017-02-22 NOTE — PROGRESS NOTES
Subjective   Patient ID: Elena Joshi is a 31 y.o. female is here today for follow-up.     There were no vitals taken for this visit.    Review of patient's allergies indicates not on file.    History of Present Illness The patient is seen today for a follow-up visit. She is in the IOP program. She states that she took Naltrexone about 10-11 days after her last use of buprenorphine but she still experienced withdrawal symptoms and had nightmares and she has stopped taking it. She agreed to give it another go. She states that her restless legs are better with the help of the medication she is taking. She states that it is the longest she has been clean in a long time and if feels really good.  She has tested positive for benzos and she states that she has an active script for Xanax 0.5 mg and states that she has had this script for a while and she has not taken it, but later she admitted that she took one to help her sleep. She agreed to stop using it any further and is aware that she will be referred to a higher level of care if she continues to use it.  The following portions of the patient's history were reviewed and updated as appropriate: allergies, current medications, past family history, past medical history, past social history, past surgical history and problem list.    PFSH:    Review of Systems   Constitutional: Negative.    HENT: Negative.    Eyes: Negative.    Respiratory: Negative.    Cardiovascular: Negative.    Gastrointestinal: Negative.         Objective   Mental Status Exam  Appearance:  clean and casually dressed, appropriate  Attitude toward clinician:  cooperative and agreeable   Speech:    Rate:  regular rate and rhythm   Volume:  normal  Motor:  no abnormal movements present  Mood:  Anxious  Affect:  euthymic  Thought Processes:  linear, logical, and goal directed  Thought Content:  normal  Suicidal Thoughts:  absent  Homicidal Thoughts:  absent  Perceptual Disturbance: no perceptual  disturbance  Attention and Concentration:  good  Insight and Judgement:  good  Memory:  memory appears to be intact    MEDICATION ISSUES:  Current Outpatient Prescriptions on File Prior to Visit   Medication Sig Dispense Refill   • naltrexone (DEPADE) 50 MG tablet Take 1 tablet by mouth Daily. 30 tablet 0   • rOPINIRole (REQUIP) 0.25 MG tablet Take 1 tablet by mouth Every Night. Take 1 hour before bedtime. 30 tablet 0     No current facility-administered medications on file prior to visit.        Lab Review:   Lab on 02/22/2017   Component Date Value   • Amphetamine Screen, Urine 02/22/2017 Negative    • Barbiturates Screen, Uri* 02/22/2017 Negative    • Benzodiazepine Screen, U* 02/22/2017 Positive*   • Cocaine Screen, Urine 02/22/2017 Negative    • Methadone Screen, Urine 02/22/2017 Negative    • Opiate Screen 02/22/2017 Negative    • Phencyclidine (PCP), Uri* 02/22/2017 Negative    • Propoxyphene Screen 02/22/2017 Negative    • THC, Screen, Urine 02/22/2017 Negative    • Buprenorphine, Urine 02/22/2017 Negative    • Oxycodone Screen, Urine 02/22/2017 Negative    Lab on 02/20/2017   Component Date Value   • Ethanol, Urine 02/20/2017 Negative    • Amphetamine Screen, Urine 02/20/2017 Negative    • Barbiturates Screen, Uri* 02/20/2017 Negative    • Benzodiazepine Screen, U* 02/20/2017 Negative    • Cocaine Screen, Urine 02/20/2017 Negative    • Methadone Screen, Urine 02/20/2017 Negative    • Opiate Screen 02/20/2017 Negative    • Phencyclidine (PCP), Uri* 02/20/2017 Negative    • Propoxyphene Screen 02/20/2017 Negative    • THC, Screen, Urine 02/20/2017 Negative    • Buprenorphine, Urine 02/20/2017 Negative    • Oxycodone Screen, Urine 02/20/2017 Negative    Lab on 02/16/2017   Component Date Value   • Ethanol, Urine 02/16/2017 Negative    • Kratom 02/16/2017 Negative    • Amphetamine Screen, Urine 02/16/2017 Negative    • Barbiturates Screen, Uri* 02/16/2017 Negative    • Benzodiazepine Screen, U* 02/16/2017  Negative    • Cocaine Screen, Urine 02/16/2017 Negative    • Methadone Screen, Urine 02/16/2017 Negative    • Opiate Screen 02/16/2017 Negative    • Phencyclidine (PCP), Uri* 02/16/2017 Negative    • Propoxyphene Screen 02/16/2017 Negative    • THC, Screen, Urine 02/16/2017 Negative    • Buprenorphine, Urine 02/16/2017 Negative    • Oxycodone Screen, Urine 02/16/2017 Negative    Orders Only on 02/15/2017   Component Date Value   • Gabapentin 02/15/2017 None Detected    Lab on 02/15/2017   Component Date Value   • Ethanol, Urine 02/15/2017 Negative    • Kratom 02/15/2017 Negative    • Amphetamine Screen, Urine 02/15/2017 Negative    • Barbiturates Screen, Uri* 02/15/2017 Negative    • Benzodiazepine Screen, U* 02/15/2017 Negative    • Cocaine Screen, Urine 02/15/2017 Negative    • Methadone Screen, Urine 02/15/2017 Negative    • Opiate Screen 02/15/2017 Negative    • Phencyclidine (PCP), Uri* 02/15/2017 Negative    • Propoxyphene Screen 02/15/2017 Negative    • THC, Screen, Urine 02/15/2017 Negative    • Buprenorphine, Urine 02/15/2017 Negative    • Oxycodone Screen, Urine 02/15/2017 Negative    Lab on 02/14/2017   Component Date Value   • Ethanol, Urine 02/14/2017 Negative    • Kratom 02/14/2017 Negative    • Amphetamine Screen, Urine 02/14/2017 Negative    • Barbiturates Screen, Uri* 02/14/2017 Negative    • Benzodiazepine Screen, U* 02/14/2017 Negative    • Cocaine Screen, Urine 02/14/2017 Negative    • Methadone Screen, Urine 02/14/2017 Negative    • Opiate Screen 02/14/2017 Negative    • Phencyclidine (PCP), Uri* 02/14/2017 Negative    • Propoxyphene Screen 02/14/2017 Negative    • THC, Screen, Urine 02/14/2017 Negative    • Buprenorphine, Urine 02/14/2017 Negative    • Oxycodone Screen, Urine 02/14/2017 Negative    Lab on 02/09/2017   Component Date Value   • Ethanol, Urine 02/09/2017 Negative    • Kratom 02/09/2017 Negative    • Amphetamine Screen, Urine 02/09/2017 Negative    • Barbiturates Screen, Uri*  02/09/2017 Negative    • Benzodiazepine Screen, U* 02/09/2017 Negative    • Cocaine Screen, Urine 02/09/2017 Negative    • Methadone Screen, Urine 02/09/2017 Negative    • Opiate Screen 02/09/2017 Negative    • Phencyclidine (PCP), Uri* 02/09/2017 Negative    • Propoxyphene Screen 02/09/2017 Negative    • THC, Screen, Urine 02/09/2017 Negative    • Buprenorphine, Urine 02/09/2017 Negative    • Oxycodone Screen, Urine 02/09/2017 Negative    Orders Only on 02/06/2017   Component Date Value   • Gabapentin 02/07/2017     • Specimen Status 02/07/2017 Comment    Lab on 02/06/2017   Component Date Value   • Ethanol, Urine 02/06/2017 Negative    • Kratom 02/06/2017 Negative    • Amphetamine Screen, Urine 02/06/2017 Negative    • Barbiturates Screen, Uri* 02/06/2017 Negative    • Benzodiazepine Screen, U* 02/06/2017 Negative    • Cocaine Screen, Urine 02/06/2017 Negative    • Methadone Screen, Urine 02/06/2017 Negative    • Opiate Screen 02/06/2017 Negative    • Phencyclidine (PCP), Uri* 02/06/2017 Negative    • Propoxyphene Screen 02/06/2017 Negative    • THC, Screen, Urine 02/06/2017 Negative    • Buprenorphine, Urine 02/06/2017 Negative    • Oxycodone Screen, Urine 02/06/2017 Negative    Office Visit on 02/01/2017   Component Date Value   • External Amphetamine Scr* 02/01/2017 Negative    • Amphetamine Cut-Off 02/01/2017 1000ml    • External Benzodiazepine * 02/01/2017 Negative    • Benzodiazipine Cut-Off 02/01/2017 300ml    • External Cocaine Screen * 02/01/2017 Negative    • Cocaine Cut-Off 02/01/2017 300ml    • External THC Screen Urine 02/01/2017 Negative    • THC Cut-Off 02/01/2017 50ml    • External Methadone Scree* 02/01/2017 Negative    • Methadone Cut-Off 02/01/2017 300ml    • External Methamphetamine* 02/01/2017 Negative    • Methamphetamine Cut-Off 02/01/2017 1000ml    • External Oxycodone Scree* 02/01/2017 Negative    • Oxycodone Cut-Off 02/01/2017 100ml    • External Buprenorphine S* 02/01/2017 Positive    •  Buprenorphine Cut-Off 02/01/2017 10ml    • External MDMA 02/01/2017 Negative    • MDMA Cut-Off 02/01/2017 500ml    • External Opiates Screen * 02/01/2017 Positive    • Opiates Cut-Off 02/01/2017 300ml    There may be more visits with results that are not included.  Assessment/Plan   Diagnoses and all orders for this visit:    Opioid type dependence, unspecified    Cannabis abuse, unspecified    Alcohol abuse, continuous    Sedative, hypnotic or anxiolytic abuse, unspecified    Return in about 2 weeks (around 3/8/2017).

## 2017-02-23 ENCOUNTER — APPOINTMENT (OUTPATIENT)
Dept: PSYCHIATRY | Facility: HOSPITAL | Age: 32
End: 2017-02-23

## 2017-02-23 ENCOUNTER — DOCUMENTATION (OUTPATIENT)
Dept: PSYCHIATRY | Facility: HOSPITAL | Age: 32
End: 2017-02-23

## 2017-02-23 LAB — ETHANOL UR-MCNC: NEGATIVE %

## 2017-02-23 NOTE — PROGRESS NOTES
Patient reported her mother is having surgery in [place] on this day 2/23/17.  Her absence was approved pending approved documentation.  She will lab on this day.

## 2017-02-23 NOTE — PROGRESS NOTES
"NAME: Elena Joshi  Date:02/22/17  8:30 AM - 11:30 AM   IOP GROUP NOTE      DATA: 3 hour IOP group therapy session (topics addressed): Check ins and group discussion on coping with feelings/relapse prevention, Relationship Skills (Communication) and Humor as a Coping Skill  Hour 1: Therapist facilitated discussion of the daily motivation passage from the “Just For Today, Daily Meditations for Recovering Addicts” ([employer] (1991). Therapist continued facilitation of rapport building strategies between group members. Therapist asked that each patient check in with home life and recovery efforts and identify triggers, cravings, and high risk situations that arise between group sessions. \"We continued to take personal inventory and when we were wrong promptly admitted it.\"  Step Ten  Hour 2: Therapist facilitated a discussion the building sober relationships and increasing communication with significant others by using Dr. Mariusz Prakashs. Personal relationship, whether with family members or romantic partners, can be some of the most triggering and difficult interactions   Hour 3: Reviewed common thinking issues and how they negatively impact the patients mood(feelings). Assisted all group members to list specific examples of errors of thinking. The patients then identified the thinking issues that they used the most and shared with in the group. The group was very supportive of one another as they shared and they bonded over how these thinking issues caused miscommunication which then created negative feeling which in the past group member used over.     Patient self disclosed that she had taken a Xanax and noted that she would screen positive for benzodiazepines.  She indicates that she felt stressed and that she is having a lot going on with her family.  Really vague with self disclosing and only indicates that it is \"family stuff\".  She indicates that she is reading the 12 step and she is talking with " others from the group.  She denies having any triggers or exposed to any high-risk 6 at  shows however she screen positive for benzos.  She indicates that she couldn't sleep which caused her to take the Xanax.  Patient requested to be off on February 23, 2017 due to her mother being off on surgery.  Therapist indicated that it would be an excused absence if she provided appropriate medical documentation showing that her mother was having surgery.  Patient also was instructed that she would have to come in and lab prior to 5:00 PM and if she did not that it would be considered a drug fAIL.  Patient indicates that she understands that if she fails to screen she could be discharged from the program.     ASSESSMENT: Order: [id number]  Benzodiazepine Screen, Urine Negative Positive (A)     Hygiene: good  Cooperation: Resistant to change (moderate)  Eye Contact: Good  Affect: Anxious  Speech: Normal  Thought Progress: Goal directed  Thought Content: Normal  Suicidal: None  Homicidal: None  Orientation: Person, Place, Time and Situation  Reliability: poor  Insight: poor  Judgement: Poor  Impulse Control: Fair  Physical/Medical Issues: Yes - See HPI      Family issues related to recovery: Limited support from family    12-Step attendance: yes  Frequency: 2 times weekly      Sponsor: Yes, Looking for one (has a temporary one)  Frequency: Yes, Whenever possilbe      Identification of environmental and personal barriers to recovery: coping with limited emotions, remorse, guilt, work, family, overall understanding of disease of addiction, legal issues,   Motivation for treatment: Healthy lifestyle, recovery, healthy relationships, resolving legal issues      RESPONSE: Patient presented to group on time,       CLINICAL MANUVERING/IProgram Assignments: Personal Journal, attendance of 12-step meetings, drug       INTERVENTIONS: CBT and group interaction activites utilized to stress relapse recovery/prevention. Patient to actively  participate in activity, engage verbally with peers and staff, display an appropriate affect, keep conversation appropriate to topic, and display no negative or impulsive behaviors. Member was encouraged to bring family members for educational group on Wednesdays and Thursday. Member was provided with encouragement and unconditional positive regard. Member was encouraged to continue participation with 12-step meetings and maintaining positive daily choices.       DASES SCORE: 85  INTAKE URICA BASELINE: 12 - Preparation stage of change   *FAILED TO TURN IN UPDATE PKT ON 2/22/17  AUDIT: 1  DAST: 8      PLAN: Continue [facility] IOP Phase I.       Aftercare: [facility] IOP Phase II      Program Assignments: Personal Journal, attendance of 12-step meetings, drug screens

## 2017-02-24 LAB — MITRAGYNINE+7-OH UR QL CFM: NEGATIVE

## 2017-02-27 ENCOUNTER — OFFICE VISIT (OUTPATIENT)
Dept: PSYCHIATRY | Facility: HOSPITAL | Age: 32
End: 2017-02-27

## 2017-02-27 ENCOUNTER — LAB (OUTPATIENT)
Dept: LAB | Facility: HOSPITAL | Age: 32
End: 2017-02-27
Attending: PSYCHIATRY & NEUROLOGY

## 2017-02-27 DIAGNOSIS — F15.10 METHAMPHETAMINE ABUSE (HCC): ICD-10-CM

## 2017-02-27 DIAGNOSIS — F17.200 TOBACCO USE DISORDER: ICD-10-CM

## 2017-02-27 DIAGNOSIS — F13.10 SEDATIVE, HYPNOTIC OR ANXIOLYTIC ABUSE, UNSPECIFIED: ICD-10-CM

## 2017-02-27 DIAGNOSIS — F11.20 OPIOID TYPE DEPENDENCE, UNSPECIFIED: Primary | ICD-10-CM

## 2017-02-27 DIAGNOSIS — F10.11 ALCOHOL ABUSE, IN REMISSION: ICD-10-CM

## 2017-02-27 DIAGNOSIS — F12.10 MILD CANNABIS USE DISORDER: ICD-10-CM

## 2017-02-27 DIAGNOSIS — F11.20 OPIOID TYPE DEPENDENCE, UNSPECIFIED: ICD-10-CM

## 2017-02-27 DIAGNOSIS — F10.10 ALCOHOL ABUSE, CONTINUOUS: ICD-10-CM

## 2017-02-27 DIAGNOSIS — F17.210 CIGARETTE SMOKER: ICD-10-CM

## 2017-02-27 DIAGNOSIS — F12.10 CANNABIS ABUSE, UNSPECIFIED: ICD-10-CM

## 2017-02-27 LAB
AMPHET+METHAMPHET UR QL: POSITIVE
BARBITURATES UR QL SCN: NEGATIVE
BENZODIAZ UR QL SCN: NEGATIVE
BUPRENORPHINE+NOR UR QL SCN: NEGATIVE
CANNABINOIDS SERPL QL: POSITIVE
COCAINE UR QL: NEGATIVE
METHADONE UR QL SCN: NEGATIVE
OPIATES UR QL: NEGATIVE
OXYCODONE UR QL SCN: NEGATIVE
PCP UR QL SCN: NEGATIVE
PROPOXYPH UR QL: NEGATIVE
SPECIMEN STATUS: NORMAL

## 2017-02-27 PROCEDURE — 80307 DRUG TEST PRSMV CHEM ANLYZR: CPT | Performed by: PSYCHIATRY & NEUROLOGY

## 2017-02-27 PROCEDURE — H0015 ALCOHOL AND/OR DRUG SERVICES: HCPCS | Performed by: COUNSELOR

## 2017-02-27 PROCEDURE — G0480 DRUG TEST DEF 1-7 CLASSES: HCPCS | Performed by: PSYCHIATRY & NEUROLOGY

## 2017-02-28 LAB — ETHANOL UR-MCNC: NEGATIVE %

## 2017-02-28 NOTE — PROGRESS NOTES
"NAME: Elena Joshi BROOKE  Date:02/27/2017  8:30 AM - 11:30 AM   IOP GROUP NOTE      DATA: 3 hour IOP group therapy session (topics addressed): Check ins, personal stories of recovery, AA Meeting      Hour 1: Therapist facilitated discussion of the daily motivation passage from the “Just For Today, Daily Meditations for Recovering Addicts” ([employer] (1991). Therapist continued facilitation of rapport building strategies between group members. Therapist asked that each patient check in with home life and recovery efforts and identify triggers, cravings, and high risk situations that arise between group sessions. \"We examine our actions, reactions, and motives. We often find that we've been doing better than we've been feeling.\" Basic Text, p.43       Hour 2: Group welcomed a new member today and other members shared their stories of recovery and relapse.       Hour 3:Rodney Cary, [facility] volunteer, facilitated the AA meeting as normally scheduled. Members took charge and facilitated their own member and provided feedback and shared current sobriety threatening issues with group. Members discussed barriers and benefits of attending 12 step meetings. Therapist provided empathy and support during group meeting.    Patient stated that she is living one day and moment at a time.  She stated that she feels overwhelmed because she has a lot that she needs to get through.  Patient still does not have a sponsor and she feels that she needs to go to more meetings, and that is one of her barriers.  Patient stated that she has one  that she called because she was experiencing a trigger about being exposed to a high risk situation where she was around someone in active addiction, and because of this, she has had cravings.  Patient admitted that addiction can be unmanageable.  Patient stated that problems with her and her mom and not being able to drive are barriers to her recovery. Patient did not screen " during her last visit and now has to attend 5 support groups per week to prevent being discharged.      ASSESSMENT: Order: [id number]    Amphetamine Screen, Urine Negative Positive (A)     THC, Screen, Urine Negative Positive (A)       Hygiene: good  Cooperation: Resistant to change (moderate)  Eye Contact: Good  Affect: Anxious  Speech: Normal  Thought Progress: Goal directed  Thought Content: Normal  Suicidal: None  Homicidal: None  Orientation: Person, Place, Time and Situation  Reliability: poor  Insight: poor  Judgement: Poor  Impulse Control: poor  Physical/Medical Issues: Yes - See HPI      Family issues related to recovery: Limited support from family    12-Step attendance: yes  Frequency: 2 times weekly      Sponsor: Yes, Looking for one (has a temporary one)  Frequency: Yes, Whenever possilbe      Identification of environmental and personal barriers to recovery: coping with limited emotions, remorse, guilt, work, family, overall understanding of disease of addiction, legal issues,   Motivation for treatment: Healthy lifestyle, recovery, healthy relationships, resolving legal issues      RESPONSE: Patient presented to group on time,       CLINICAL MANUVERING/IProgram Assignments: Personal Journal, attendance of 12-step meetings, drug       INTERVENTIONS: CBT and group interaction activites utilized to stress relapse recovery/prevention. Patient to actively participate in activity, engage verbally with peers and staff, display an appropriate affect, keep conversation appropriate to topic, and display no negative or impulsive behaviors. Member was encouraged to bring family members for educational group on Wednesdays and Thursday. Member was provided with encouragement and unconditional positive regard. Member was encouraged to continue participation with 12-step meetings and maintaining positive daily choices.       DASES SCORE: 85  INTAKE URICA BASELINE: 12 - Preparation stage of change *FAILED TO TURN IN  UPDATE PKT ON 2/22/17  AUDIT: 1  DAST: 8      PLAN: Continue [facility] IOP Phase I.       Aftercare: [facility] IOP Phase II      Program Assignments: Personal Journal, attendance of 12-step meetings, drug screens

## 2017-03-01 ENCOUNTER — LAB (OUTPATIENT)
Dept: LAB | Facility: HOSPITAL | Age: 32
End: 2017-03-01
Attending: PSYCHIATRY & NEUROLOGY

## 2017-03-01 ENCOUNTER — OFFICE VISIT (OUTPATIENT)
Dept: PSYCHIATRY | Facility: HOSPITAL | Age: 32
End: 2017-03-01

## 2017-03-01 DIAGNOSIS — F12.10 CANNABIS ABUSE, UNSPECIFIED: ICD-10-CM

## 2017-03-01 DIAGNOSIS — F10.11 ALCOHOL ABUSE, IN REMISSION: ICD-10-CM

## 2017-03-01 DIAGNOSIS — F11.20 OPIOID TYPE DEPENDENCE, UNSPECIFIED: Primary | ICD-10-CM

## 2017-03-01 DIAGNOSIS — F12.10 MILD CANNABIS USE DISORDER: ICD-10-CM

## 2017-03-01 DIAGNOSIS — F13.10 SEDATIVE, HYPNOTIC OR ANXIOLYTIC ABUSE, UNSPECIFIED: ICD-10-CM

## 2017-03-01 DIAGNOSIS — F11.20 OPIOID TYPE DEPENDENCE, UNSPECIFIED: ICD-10-CM

## 2017-03-01 DIAGNOSIS — F10.10 ALCOHOL ABUSE, CONTINUOUS: ICD-10-CM

## 2017-03-01 DIAGNOSIS — F15.10 METHAMPHETAMINE ABUSE (HCC): ICD-10-CM

## 2017-03-01 DIAGNOSIS — F17.200 TOBACCO USE DISORDER: ICD-10-CM

## 2017-03-01 DIAGNOSIS — F17.210 CIGARETTE SMOKER: ICD-10-CM

## 2017-03-01 LAB
AMPHET+METHAMPHET UR QL: POSITIVE
BARBITURATES UR QL SCN: NEGATIVE
BENZODIAZ UR QL SCN: NEGATIVE
BUPRENORPHINE+NOR UR QL SCN: NEGATIVE
CANNABINOIDS SERPL QL: NEGATIVE
COCAINE UR QL: NEGATIVE
METHADONE UR QL SCN: NEGATIVE
OPIATES UR QL: NEGATIVE
OXYCODONE UR QL SCN: NEGATIVE
PCP UR QL SCN: NEGATIVE
PROPOXYPH UR QL: NEGATIVE

## 2017-03-01 PROCEDURE — 80307 DRUG TEST PRSMV CHEM ANLYZR: CPT | Performed by: PSYCHIATRY & NEUROLOGY

## 2017-03-01 PROCEDURE — H0015 ALCOHOL AND/OR DRUG SERVICES: HCPCS | Performed by: COUNSELOR

## 2017-03-01 PROCEDURE — G0480 DRUG TEST DEF 1-7 CLASSES: HCPCS | Performed by: PSYCHIATRY & NEUROLOGY

## 2017-03-02 ENCOUNTER — LAB (OUTPATIENT)
Dept: LAB | Facility: HOSPITAL | Age: 32
End: 2017-03-02
Attending: PSYCHIATRY & NEUROLOGY

## 2017-03-02 ENCOUNTER — OFFICE VISIT (OUTPATIENT)
Dept: PSYCHIATRY | Facility: HOSPITAL | Age: 32
End: 2017-03-02

## 2017-03-02 DIAGNOSIS — F15.20 METHAMPHETAMINE DEPENDENCE (HCC): ICD-10-CM

## 2017-03-02 DIAGNOSIS — F12.20 CANNABIS DEPENDENCE (HCC): ICD-10-CM

## 2017-03-02 DIAGNOSIS — F13.10 SEDATIVE, HYPNOTIC OR ANXIOLYTIC ABUSE, UNSPECIFIED: ICD-10-CM

## 2017-03-02 DIAGNOSIS — F12.10 MILD CANNABIS USE DISORDER: ICD-10-CM

## 2017-03-02 DIAGNOSIS — F11.20 OPIOID TYPE DEPENDENCE, UNSPECIFIED: ICD-10-CM

## 2017-03-02 DIAGNOSIS — F11.20 OPIOID TYPE DEPENDENCE, UNSPECIFIED: Primary | ICD-10-CM

## 2017-03-02 DIAGNOSIS — F17.200 TOBACCO USE DISORDER: ICD-10-CM

## 2017-03-02 DIAGNOSIS — F10.11 ALCOHOL ABUSE, IN REMISSION: ICD-10-CM

## 2017-03-02 LAB
AMPHET+METHAMPHET UR QL: POSITIVE
BARBITURATES UR QL SCN: NEGATIVE
BENZODIAZ UR QL SCN: NEGATIVE
BUPRENORPHINE+NOR UR QL SCN: NEGATIVE
CANNABINOIDS SERPL QL: NEGATIVE
COCAINE UR QL: NEGATIVE
ETHANOL UR-MCNC: NEGATIVE %
METHADONE UR QL SCN: NEGATIVE
OPIATES UR QL: NEGATIVE
OXYCODONE UR QL SCN: NEGATIVE
PCP UR QL SCN: NEGATIVE
PROPOXYPH UR QL: NEGATIVE

## 2017-03-02 PROCEDURE — 80307 DRUG TEST PRSMV CHEM ANLYZR: CPT | Performed by: PSYCHIATRY & NEUROLOGY

## 2017-03-02 PROCEDURE — G0480 DRUG TEST DEF 1-7 CLASSES: HCPCS | Performed by: PSYCHIATRY & NEUROLOGY

## 2017-03-02 PROCEDURE — H0015 ALCOHOL AND/OR DRUG SERVICES: HCPCS | Performed by: COUNSELOR

## 2017-03-02 NOTE — PROGRESS NOTES
"NAME: Elena Joshi BROOKE  Date:03/01/17  8:30 AM - 11:30 AM   IOP GROUP NOTE      DATA: 3 hour IOP group therapy session (topics addressed): Check ins, personal stories of recovery, Addictive Relationships      Hour 1: Therapist facilitated discussion of the daily motivation passage from the “Just For Today, Daily Meditations for Recovering Addicts” ([employer] (1991). Therapist continued facilitation of rapport building strategies between group members. Therapist asked that each patient check in with home life and recovery efforts and identify triggers, cravings, and high risk situations that arise between group sessions. \"[The] Power that brought us to this program is still with us and will continue to guide us if we allow it. \"    Hour 2: Group welcomed two new members today and other members shared their stories of recovery and relapse.       Hour 3: Therapist facilitated a group that allowed members to view a video on compulsive relationships which guide severe to and understanding of compulsive relationships.  The video guide severe to and understanding on how they may have started her relationship and who the players are in addictive relationships such as counter dependence and the codependents.  In the end of years are provided with significant information so that they may be able to have in-depth discussions of addictive relationships and start the recovery process toward forming healthy relationships as they relate to their addictive personalities.     Patient indicates that she is trying to stay sober and taking 1 day and one moment at a time she indicates that she is feeling overwhelmed and that she is having a lot of family issues at this time she is experiencing a lot of triggers and she is exposed to high average 6 situations patient indicates on her checking sheet that she has been clean and sober or 24 days however she continues to screen positive for methamphetamines and reports that she has " been using opiates and marijuana.  Patient filled out a substance abuse history on this day and indicates that her last use of marijuana was they've.  The 25th 2017 and her last use of OxyContin was on February 5, 2017 and her last use of a pain pill was February 23, 2017 and her last use of methamphetamine was February 26, 2017 and her last use of Suboxone was February 5, 2017 patient has agreed to go to a residential rehabilitation.  Patient reports that she has been using drugs with a fellow group member and has agreed to change groups to avoid discharge.  She [facility], 2017.  However the intake  and said that she can call every Friday to see if they have an open bed sooner.  Patient indicates that she enjoys talking with others.      ASSESSMENT: Order: [id number]   Amphetamine Screen, Urine Negative Positive (A)      Hygiene: good  Cooperation: Resistant to change (moderate)  Eye Contact: Good  Affect: Anxious  Speech: Normal  Thought Progress: Goal directed  Thought Content: Normal  Suicidal: None  Homicidal: None  Orientation: Person, Place, Time and Situation  Reliability: poor  Insight: poor  Judgement: Poor  Impulse Control: poor  Physical/Medical Issues: Yes - See HPI      Family issues related to recovery: Limited support from family    12-Step attendance: yes  Frequency: 2 times weekly      Sponsor: Yes, Looking for one (has a temporary one)  Frequency: Yes, Whenever possilbe      Identification of environmental and personal barriers to recovery: coping with limited emotions, remorse, guilt, work, family, overall understanding of disease of addiction, legal issues,   Motivation for treatment: Healthy lifestyle, recovery, healthy relationships, resolving legal issues      RESPONSE: Patient presented to group on time,       CLINICAL MANUVERING/IProgram Assignments: Personal Journal, attendance of 12-step meetings, drug       INTERVENTIONS: CBT and group  interaction activites utilized to stress relapse recovery/prevention. Patient to actively participate in activity, engage verbally with peers and staff, display an appropriate affect, keep conversation appropriate to topic, and display no negative or impulsive behaviors. Member was encouraged to bring family members for educational group on Wednesdays and Thursday. Member was provided with encouragement and unconditional positive regard. Member was encouraged to continue participation with 12-step meetings and maintaining positive daily choices.       DASES SCORE: 85  INTAKE URICA BASELINE: 12 - Preparation stage of change *FAILED TO TURN IN UPDATE PKT ON 2/22/17  AUDIT: 1  DAST: 8      PLAN: Continue [facility] IOP Phase I.       Aftercare: [facility] IOP Phase II      Program Assignments: Personal Journal, attendance of 12-step meetings, drug screens

## 2017-03-02 NOTE — PROGRESS NOTES
SUBSTANCE ABUSE HISTORY    Substance Use History: Elena Joshi 3/1/17    DRUG PRESENT USE AGE @ 1ST USE  HOW LONG AT THIS RATE HOW MUCH ROUTE HOW OFTEN Date of SHAHIDA/AMT   Nicotine Yes [age] yrs 1 ppd Smoke Daily 3/1/17   Alcohol Past [age] yrs 5 glasses Oral Once yrly 2016   Marijuana Yes 16 (tried)  26  2 blunts  Smoke 5 times per  Weed 2/25/17  (2 hits)   Xanax Yes 19's 5-7 yrs .5 mg  Inhaled Occ   2/21/17   OxyContin Denies         Methadone Denies         Other Pain Pills:  Roxicet Current 19  30 x 15 Oral, Inhaled Daily 2/23/17   Cocaine /crank Denies         Heroin Denies         Meth Rec 27 4 ½ gram  Approx.  1/4 gr IV/Smoke  Smoke Occasional  Occasional 3/1/17   Suboxone Past 29 2  Sublingual Occasional 2/5/17   History of DTs [  ] Yes   [x   ] No                      History of Seizures  Yes  [  ] No [ x ]  (explain)  WITHDRAWAL SYMPTOMS:   [  ] NA  [x ]withdrawal By hx:  [ ]dizziness   [x ] headaches   [x ]shaking inside/outside   [ x ]nausea  [  ]vomiting   [ ]palpitations [ x ]cold sweats    [x ]feeling hot and cold    [ x ]abdominal cramps  [ x ]diarrhea       [  ]seizures [x ]high blood pressure   [x ]leg cramps  [x ]body aches [ ]diaphoresis   [x ]other_lack of appetite, restless sleep, cranky, insomnia     [x ]craving         [ x  ]yes  [  ]no  if yes rate on scale 1-10      ____7_______

## 2017-03-03 LAB
ETHANOL UR-MCNC: NEGATIVE %
MITRAGYNINE+7-OH UR QL CFM: NEGATIVE
MITRAGYNINE+7-OH UR QL CFM: NEGATIVE

## 2017-03-03 NOTE — PROGRESS NOTES
Adi Elena Martinese  Date:03/02/17  1:00 pm - 4:00 pm  IOP GROUP NOTE      DATA: 3 hour IOP group therapy session (topics addressed): Check ins, personal stories of recovery, Supportive Others through Recovery    Hour 1: Therapist facilitated discussion of the daily motivation passage from the “Just For Today, Daily Meditations for Recovering Addicts” ([employer] (1991). Therapist continued facilitation of rapport building strategies between group members. Therapist asked that each patient check in with home life and recovery efforts and identify triggers, cravings, and high risk situations that arise between group sessions.   Hour 2: Benito, The recreation therapist, provided recreational therapy and encouraged them to enjoy leisure activities (made a bracelet).     Patient reports using meth the day prior and tested positive for methamphetamine patient continues to agree to go to rehabilitation that was resistant and fail to participate in group.  Patient indicates that her sober life is more fulfilling that she feels depressed and she is struggling with herself on this day.  She indicates that she is dealing with everything.  She indicates that her sobriety starts today.  She indicates that she feels awful.  She indicates that she states that her barriers are herself and that she fights with it and her lack of support.  She states that she doesn't have anyone to call her speech she feels alone.  She has been around triggers and she has high risk situations and she struggles with addictions she has cravings and she rates them a 10 on a scale of 1-10 with 10 being the most intense she thinks that she is her own worst barrier for recovering.    ASSESSMENT: Order: [id number]  Amphetamine Screen, Urine Negative Positive (A)       Hygiene: good  Cooperation: Resistant to change (severe)  Eye Contact: poor  Affect: flat/blunterAnxious  Speech: Normal  Thought Progress: Goal directed  Thought Content: Normal  Suicidal:  None  Homicidal: None  Orientation: Person, Place, Time and Situation  Reliability: poor  Insight: poor  Judgement: Poor  Impulse Control: poor  Physical/Medical Issues: Yes - See HPI      Family issues related to recovery: Limited support from family    12-Step attendance: no  Frequency: 0      Sponsor: no  Frequency: no      Identification of environmental and personal barriers to recovery: coping with limited emotions, remorse, guilt, work, family, overall understanding of disease of addiction, legal issues,   Motivation for treatment: Healthy lifestyle, recovery, healthy relationships, resolving legal issues      RESPONSE: Patient presented to group on time,       CLINICAL MANUVERING/IProgram Assignments: Personal Journal, attendance of 12-step meetings, drug       INTERVENTIONS: CBT and group interaction activites utilized to stress relapse recovery/prevention. Patient to actively participate in activity, engage verbally with peers and staff, display an appropriate affect, keep conversation appropriate to topic, and display no negative or impulsive behaviors. Member was encouraged to bring family members for educational group on Wednesdays and Thursday. Member was provided with encouragement and unconditional positive regard. Member was encouraged to continue participation with 12-step meetings and maintaining positive daily choices.       DASES SCORE: 85  INTAKE URICA BASELINE: 12 - Preparation stage of change   AUDIT: 1  DAST: 8      PLAN: Continue [facility] IOP Phase I.       Aftercare: [facility] IOP Phase II      Program Assignments: Personal Journal, attendance of 12-step meetings, drug screens

## 2017-03-05 ENCOUNTER — HOSPITAL ENCOUNTER (EMERGENCY)
Facility: HOSPITAL | Age: 32
Discharge: HOME | End: 2017-03-05
Admitting: EMERGENCY MEDICINE

## 2017-03-05 VITALS
RESPIRATION RATE: 16 BRPM | TEMPERATURE: 98.1 F | DIASTOLIC BLOOD PRESSURE: 90 MMHG | BODY MASS INDEX: 36.88 KG/M2 | HEIGHT: 67 IN | OXYGEN SATURATION: 100 % | WEIGHT: 235 LBS | SYSTOLIC BLOOD PRESSURE: 140 MMHG | HEART RATE: 90 BPM

## 2017-03-05 DIAGNOSIS — N39.0 ACUTE UTI: ICD-10-CM

## 2017-03-05 DIAGNOSIS — A59.01 TRICHOMONAL VAGINITIS: Primary | ICD-10-CM

## 2017-03-05 LAB
BACTERIA UR QL AUTO: ABNORMAL /HPF
BILIRUB UR QL STRIP: NEGATIVE
C TRACH RRNA CVX QL NAA+PROBE: NOT DETECTED
CLARITY UR: ABNORMAL
COLOR UR: ABNORMAL
GLUCOSE UR STRIP-MCNC: NEGATIVE MG/DL
HGB UR QL STRIP.AUTO: ABNORMAL
HYDATID CYST SPEC WET PREP: NORMAL
KETONES UR QL STRIP: ABNORMAL
KOH PREP NAIL: NORMAL
LEUKOCYTE ESTERASE UR QL STRIP.AUTO: ABNORMAL
N GONORRHOEA RRNA SPEC QL NAA+PROBE: NOT DETECTED
NITRITE UR QL STRIP: NEGATIVE
PH UR STRIP.AUTO: 6.5 [PH] (ref 5–8)
PROT UR QL STRIP: ABNORMAL
RBC # UR: ABNORMAL /HPF
REF LAB TEST METHOD: ABNORMAL
SP GR UR STRIP: 1.02 (ref 1–1.03)
SQUAMOUS #/AREA URNS HPF: ABNORMAL /HPF
T VAGINALIS SPEC QL WET PREP: NORMAL
TRICHOMONAS #/AREA URNS HPF: ABNORMAL /HPF
UROBILINOGEN UR QL STRIP: ABNORMAL
WBC SPEC QL WET PREP: NORMAL
WBC UR QL AUTO: ABNORMAL /HPF
YEAST GENITAL QL WET PREP: NORMAL

## 2017-03-05 PROCEDURE — 87205 SMEAR GRAM STAIN: CPT | Performed by: NURSE PRACTITIONER

## 2017-03-05 PROCEDURE — 99283 EMERGENCY DEPT VISIT LOW MDM: CPT

## 2017-03-05 PROCEDURE — 25010000002 CEFTRIAXONE PER 250 MG: Performed by: NURSE PRACTITIONER

## 2017-03-05 PROCEDURE — 87070 CULTURE OTHR SPECIMN AEROBIC: CPT | Performed by: NURSE PRACTITIONER

## 2017-03-05 PROCEDURE — 81001 URINALYSIS AUTO W/SCOPE: CPT | Performed by: NURSE PRACTITIONER

## 2017-03-05 PROCEDURE — 87800 DETECT AGNT MULT DNA DIREC: CPT | Performed by: NURSE PRACTITIONER

## 2017-03-05 PROCEDURE — 96372 THER/PROPH/DIAG INJ SC/IM: CPT

## 2017-03-05 PROCEDURE — 87220 TISSUE EXAM FOR FUNGI: CPT | Performed by: NURSE PRACTITIONER

## 2017-03-05 PROCEDURE — 87086 URINE CULTURE/COLONY COUNT: CPT | Performed by: NURSE PRACTITIONER

## 2017-03-05 PROCEDURE — 87210 SMEAR WET MOUNT SALINE/INK: CPT | Performed by: NURSE PRACTITIONER

## 2017-03-05 RX ORDER — LIDOCAINE HYDROCHLORIDE 10 MG/ML
0.9 INJECTION, SOLUTION EPIDURAL; INFILTRATION; INTRACAUDAL; PERINEURAL ONCE
Status: COMPLETED | OUTPATIENT
Start: 2017-03-05 | End: 2017-03-05

## 2017-03-05 RX ORDER — CEFTRIAXONE SODIUM 250 MG/1
250 INJECTION, POWDER, FOR SOLUTION INTRAMUSCULAR; INTRAVENOUS ONCE
Status: COMPLETED | OUTPATIENT
Start: 2017-03-05 | End: 2017-03-05

## 2017-03-05 RX ORDER — METRONIDAZOLE 500 MG/1
500 TABLET ORAL 2 TIMES DAILY
Qty: 14 TABLET | Refills: 0 | Status: SHIPPED | OUTPATIENT
Start: 2017-03-05 | End: 2017-08-29 | Stop reason: ALTCHOICE

## 2017-03-05 RX ORDER — AZITHROMYCIN 250 MG/1
1000 TABLET, FILM COATED ORAL ONCE
Status: COMPLETED | OUTPATIENT
Start: 2017-03-05 | End: 2017-03-05

## 2017-03-05 RX ADMIN — LIDOCAINE HYDROCHLORIDE 0.9 ML: 10 INJECTION, SOLUTION EPIDURAL; INFILTRATION; INTRACAUDAL; PERINEURAL at 17:00

## 2017-03-05 RX ADMIN — CEFTRIAXONE SODIUM 250 MG: 250 INJECTION, POWDER, FOR SOLUTION INTRAMUSCULAR; INTRAVENOUS at 17:01

## 2017-03-05 RX ADMIN — AZITHROMYCIN 1000 MG: 250 TABLET, FILM COATED ORAL at 17:00

## 2017-03-05 NOTE — ED PROVIDER NOTES
Subjective   Patient is a 31 y.o. female presenting with female genitourinary complaint.   Female  Problem   Primary symptoms include discharge, genital odor, dysuria. There has been no fever. This is a new problem. The problem occurs constantly. The problem has not changed since onset.The symptoms occur during urination and after urination. She is not pregnant. She has not missed her period. Her LMP was weeks ago. The discharge was normal. Pertinent negatives include no anorexia, no diaphoresis, no abdominal pain, no constipation, no diarrhea, no nausea, no vomiting, no frequency, no light-headedness and no dizziness. She has tried nothing for the symptoms. Sexual activity: sexually active. She uses nothing for contraception. Associated medical issues do not include STD, PID, herpes simplex, perineal abscess, hypermenorrhea, ovarian cysts, endometriosis, gynecological surgery, ectopic pregnancy,  section or terminated pregnancy.       Review of Systems   Constitutional: Negative.  Negative for diaphoresis.   HENT: Negative.    Eyes: Negative.    Respiratory: Negative.    Cardiovascular: Negative.    Gastrointestinal: Negative.  Negative for abdominal pain, anorexia, constipation, diarrhea, nausea and vomiting.   Endocrine: Negative.    Genitourinary: Positive for dysuria. Negative for frequency.   Musculoskeletal: Negative.    Skin: Negative.    Allergic/Immunologic: Negative.    Neurological: Negative.  Negative for dizziness and light-headedness.   Hematological: Negative.    Psychiatric/Behavioral: Negative.        History reviewed. No pertinent past medical history.    Allergies   Allergen Reactions   • Penicillins        History reviewed. No pertinent past surgical history.    History reviewed. No pertinent family history.    Social History     Social History   • Marital status:      Spouse name: N/A   • Number of children: N/A   • Years of education: N/A     Social History Main Topics   •  Smoking status: Current Every Day Smoker     Packs/day: 0.50     Types: Cigarettes   • Smokeless tobacco: None   • Alcohol use No   • Drug use: No   • Sexual activity: Not Asked     Other Topics Concern   • None     Social History Narrative   • None           Objective   Physical Exam   Constitutional: She is oriented to person, place, and time. She appears well-developed and well-nourished.   HENT:   Head: Normocephalic.   Right Ear: External ear normal.   Left Ear: External ear normal.   Mouth/Throat: Oropharynx is clear and moist.   Eyes: Conjunctivae and EOM are normal. Pupils are equal, round, and reactive to light.   Neck: Normal range of motion. Neck supple.   Cardiovascular: Normal rate and regular rhythm.    Pulmonary/Chest: Effort normal and breath sounds normal.   Abdominal: Soft. Bowel sounds are normal.   Musculoskeletal: Normal range of motion.   Neurological: She is alert and oriented to person, place, and time.   Skin: Skin is warm and dry.   Psychiatric: She has a normal mood and affect. Her behavior is normal.   Nursing note and vitals reviewed.      Procedures         ED Course  ED Course                  MDM    Final diagnoses:   Trichomonal vaginitis   Acute UTI            Sinan Augustine, APRN  03/05/17 [id number]

## 2017-03-06 ENCOUNTER — LAB (OUTPATIENT)
Dept: LAB | Facility: HOSPITAL | Age: 32
End: 2017-03-06
Attending: PSYCHIATRY & NEUROLOGY

## 2017-03-06 ENCOUNTER — OFFICE VISIT (OUTPATIENT)
Dept: PSYCHIATRY | Facility: HOSPITAL | Age: 32
End: 2017-03-06

## 2017-03-06 DIAGNOSIS — F12.20 CANNABIS DEPENDENCE (HCC): ICD-10-CM

## 2017-03-06 DIAGNOSIS — F11.20 OPIOID TYPE DEPENDENCE, UNSPECIFIED: ICD-10-CM

## 2017-03-06 DIAGNOSIS — F12.10 MILD CANNABIS USE DISORDER: ICD-10-CM

## 2017-03-06 DIAGNOSIS — F17.200 TOBACCO USE DISORDER: ICD-10-CM

## 2017-03-06 DIAGNOSIS — F11.20 OPIOID TYPE DEPENDENCE, UNSPECIFIED: Primary | ICD-10-CM

## 2017-03-06 DIAGNOSIS — F15.20 METHAMPHETAMINE DEPENDENCE (HCC): ICD-10-CM

## 2017-03-06 DIAGNOSIS — F10.11 ALCOHOL ABUSE, IN REMISSION: ICD-10-CM

## 2017-03-06 DIAGNOSIS — F13.10 SEDATIVE, HYPNOTIC OR ANXIOLYTIC ABUSE, UNSPECIFIED: ICD-10-CM

## 2017-03-06 LAB
MITRAGYNINE+7-OH UR QL CFM: NEGATIVE
MITRAGYNINE+7-OH UR QL CFM: NEGATIVE

## 2017-03-06 PROCEDURE — H0015 ALCOHOL AND/OR DRUG SERVICES: HCPCS | Performed by: COUNSELOR

## 2017-03-06 PROCEDURE — 80307 DRUG TEST PRSMV CHEM ANLYZR: CPT | Performed by: PSYCHIATRY & NEUROLOGY

## 2017-03-06 PROCEDURE — G0480 DRUG TEST DEF 1-7 CLASSES: HCPCS | Performed by: PSYCHIATRY & NEUROLOGY

## 2017-03-07 ENCOUNTER — APPOINTMENT (OUTPATIENT)
Dept: LAB | Facility: HOSPITAL | Age: 32
End: 2017-03-07
Attending: PSYCHIATRY & NEUROLOGY

## 2017-03-07 ENCOUNTER — OFFICE VISIT (OUTPATIENT)
Dept: PSYCHIATRY | Facility: CLINIC | Age: 32
End: 2017-03-07

## 2017-03-07 DIAGNOSIS — F15.20 METHAMPHETAMINE DEPENDENCE (HCC): ICD-10-CM

## 2017-03-07 DIAGNOSIS — F11.20 OPIOID TYPE DEPENDENCE, UNSPECIFIED: Primary | ICD-10-CM

## 2017-03-07 DIAGNOSIS — F13.10 SEDATIVE, HYPNOTIC OR ANXIOLYTIC ABUSE, UNSPECIFIED: ICD-10-CM

## 2017-03-07 DIAGNOSIS — F12.10 CANNABIS ABUSE, UNSPECIFIED: ICD-10-CM

## 2017-03-07 DIAGNOSIS — F43.25 ADJUSTMENT DISORDER WITH MIXED DISTURBANCE OF EMOTIONS AND CONDUCT: ICD-10-CM

## 2017-03-07 DIAGNOSIS — F12.20 CANNABIS DEPENDENCE (HCC): ICD-10-CM

## 2017-03-07 DIAGNOSIS — F10.10 ALCOHOL ABUSE, CONTINUOUS: ICD-10-CM

## 2017-03-07 PROCEDURE — 80307 DRUG TEST PRSMV CHEM ANLYZR: CPT | Performed by: PSYCHIATRY & NEUROLOGY

## 2017-03-07 PROCEDURE — G0480 DRUG TEST DEF 1-7 CLASSES: HCPCS | Performed by: PSYCHIATRY & NEUROLOGY

## 2017-03-07 PROCEDURE — 90834 PSYTX W PT 45 MINUTES: CPT | Performed by: COUNSELOR

## 2017-03-07 NOTE — PROGRESS NOTES
"NAME: Elena Joshi  Date:03/06/17  1:00 pm - 4:00 pm  IOP GROUP NOTE      DATA: 3 hour IOP group therapy session (topics addressed): Check ins, personal stories of recovery, Supportive Others through Recovery, Cognitive Distortions  Hour 1: Therapist facilitated discussion of the daily motivation passage from the “Just For Today, Daily Meditations for Recovering Addicts” ([employer] (1991). Therapist continued facilitation of rapport building strategies between group members. Therapist asked that each patient check in with home life and recovery efforts and identify triggers, cravings, and high risk situations that arise between group sessions. \"As a result of the Twelve Steps, I'm not able to hold on to old ways of deceiving myself.\"   Hour 2: Therapist facilitated discussion on common cognitive errors. We all have patterns of thinking which impact are emotional state and behaviors. Sometimes her patterns are less than accurate. These are cognitive errors and they typically fall into certain categories. Group members identified their own cognitive errors and discussed ways they're negative thinking impacted their emotions and their behaviors and how they contributed to their addictive patterns of behaviors.   Hour 3: Continuation of discussion of cognitive errors -     Patient reports that she is working hard to overcome her addiction and she is trying to work on becoming herself again.  She indicates that her and another group member from a morning IOP used up to a half a gram or more over the weekend IV/smoke of 1/2 - 1 gram of  methamphetamine.  She indicates that she doesn't know how she feels and she is doing things that she knows not to do.  She indicates that she feels compelled to do these things and that she makes bad decisions.  She indicates that she struggles with labeling herself as the good person versus the bad person she feels like that she is triggered by being around people and high risk " "situations and calls them \"like me \".  Patient is agreeable to going to the rehabilitation the following day.  She reports going to the ER for a UTI and is concerned that she may have a STD - Patient also concerned over ER doctor being able to see IOP appointments in [facility] - pt was informed of [facility] limits and to follow up with her PCP/Health dept for further testing for HIV/HEP A/B/C testing.  ASSESSMENT: UDS - ?       Hygiene: good  Cooperation: Open to change  Eye Contact: poor  Affect: flat/blunterAnxious  Speech: Normal  Thought Progress: Goal directed  Thought Content: Normal  Suicidal: None  Homicidal: None  Orientation: Person, Place, Time and Situation  Reliability: poor  Insight: poor  Judgement: Poor  Impulse Control: poor  Physical/Medical Issues: Yes - See HPI      Family issues related to recovery: Limited support from family    12-Step attendance: no  Frequency: 0      Sponsor: no  Frequency: no      Identification of environmental and personal barriers to recovery: coping with limited emotions, remorse, guilt, work, family, overall understanding of disease of addiction, legal issues,   Motivation for treatment: Healthy lifestyle, recovery, healthy relationships, resolving legal issues      RESPONSE: Patient presented to group on time,       CLINICAL MANUVERING/IProgram Assignments: Personal Journal, attendance of 12-step meetings, drug       INTERVENTIONS: CBT and group interaction activites utilized to stress relapse recovery/prevention. Patient to actively participate in activity, engage verbally with peers and staff, display an appropriate affect, keep conversation appropriate to topic, and display no negative or impulsive behaviors. Member was encouraged to bring family members for educational group on Wednesdays and Thursday. Member was provided with encouragement and unconditional positive regard. Member was encouraged to continue participation with 12-step meetings and maintaining positive daily " choices.       DASES SCORE: 85  INTAKE URICA BASELINE: 12 - Preparation stage of change   AUDIT: 1  DAST: 8      PLAN: Discharge to Residential ([facility])  Readmit to [facility] after successfully completion of 30 day treatment program.

## 2017-03-07 NOTE — PROGRESS NOTES
.  IDENTIFYING INFORMATION:   The patient, Elena Joshi, is a 31 y.o. female who is here today for a follow up appointment starting at 10:15 am and ending at 11:00 am.  Elena presented for an individual session as scheduled.      CHIEF COMPLIANT:  anxiety disorder, substance use and depression    (Scales based on 0 - 10 with 10 being the worst)  Depression: 8 Anxiety: 6   Distress: 8 Sleep: 6   Tasks Completed on Time: 0 Mood: 7   Number of Panic Attacks: 0 Appetite: 0       HPI: Plan for [facility]: Bed for 3/8/17 - assessment on 3/7/17 at 2 pm.  Patient came in as scheduled.  Patient and therapist processed her discharge from [facility] and her admit to [facility].  Patien explored her relationship with her family and her emotional connection with her children.  Patient also discussed her need for acceptance of others and how it relates to her history of addiction.  Patient denies any SI/HI/AVH.  Patient continues to be appropriate for referral for residential treatment.  Patient will readmit to [facility] upon to's successful completion of 30 day residential treatment.  Patient has a 3:00 admit time at [address] [facility] on 3/8/2017.  Called sister with no response -   CURRENT SUBSTANCE USE:  PATIENT SCREENS POSITIVE FOR METHAMPHETAMINE    RECENT PSYCH TREATMENT:  DENIESAL HISTORY:   No past medical history on file.     CURRENT MEDICATIONS:  Current Outpatient Prescriptions   Medication Sig Dispense Refill   • metroNIDAZOLE (FLAGYL) 500 MG tablet Take 1 tablet by mouth 2 (Two) Times a Day. 14 tablet 0   • naltrexone (DEPADE) 50 MG tablet Take 1 tablet by mouth Daily. 30 tablet 0   • rOPINIRole (REQUIP) 0.25 MG tablet Take 1 tablet by mouth Every Night. Take 1 hour before bedtime. 30 tablet 0     No current facility-administered medications for this visit.        MENTAL STATUS EXAM:   Hygiene:   good  Cooperation:  Cooperative  Eye Contact:  Good  Psychomotor Behavior:  Appropriate  Affect:  Labile  Hopelessness:  Denies  Speech:  Normal  Thought Process:  Goal directed and Linear  Thought Content:  Normal  Suicidal:  None  Homicidal:  None  Hallucinations:  None  Delusion:  None  Memory:  Intact  Orientation:  Person, Place, Time and Unable to evaluate  Reliability:  Poor  Insight:  Poor  Judgement:  Poor  Impulse Control:  Poor  Physical/Medical Issues:  UTI infection  Overall: Depressed, Anxious     STRENGTHS:    patient appears motivated for treatment is currently engaged and compliant    WEAKNESSES:  Poor self image, poor impulse control    SUPPORT SYSTEM: limited    SHORT-TERM GOALS: Patient will be compliant with clinic appointments.  Patient will be engaged in therapy, medication compliant with minimal side effects. Patient  will report decrease of symptoms and frequency.    LONG-TERM GOALS: Patient will have minimal symptoms of  with continued medication management. Patient will be compliant with treatment and appointments.     VISIT DIAGNOSIS:     ICD-10-CM ICD-9-CM   1. Opioid type dependence, unspecified F11.20 304.00   2. Methamphetamine dependence F15.20 304.40   3. Sedative, hypnotic or anxiolytic abuse, unspecified F13.10 305.40   4. Cannabis dependence F12.20 304.30   5. Adjustment disorder with mixed disturbance of emotions and conduct F43.25 309.4        Diagnoses and all orders for this visit:    Opioid type dependence, unspecified    Methamphetamine dependence    Sedative, hypnotic or anxiolytic abuse, unspecified    Cannabis dependence    Adjustment disorder with mixed disturbance of emotions and conduct        Clinical Maneuvering/Intervention:  Applied Cognitive therapy and positive coping skills. Encouraged pt. to use the automatic negative thought worksheet. Pt. was encouraged to use positive coping skills of sticking to a daily schedule, taking medication as prescribed, getting daily exercise, eating healthy, and applying positive self talk. Reviewed the crisis safety plan to come to the emergency  room if suicidal or homicidal.  Denied Suicidal or Homicidal ideations. Ongoing treatment to prevent decompensation.        Elena will continue in individual outpatient treatment with primary therapist and pharmacotherapy as scheduled.     NICOTINE USE:  Yes - daily cigarette use, smoking cessation discussed    Elena will contact staff or crisis line if symptoms exacerbate or if harm to self or others becomes a concern. Crisis resources include: Crisis Line [phone], 911, Local Law Enforcement, [place], Emergency Room [phone], and the Rape Crisis Hotline [phone].

## 2017-03-08 ENCOUNTER — APPOINTMENT (OUTPATIENT)
Dept: PSYCHIATRY | Facility: HOSPITAL | Age: 32
End: 2017-03-08

## 2017-03-08 ENCOUNTER — DOCUMENTATION (OUTPATIENT)
Dept: PSYCHIATRY | Facility: HOSPITAL | Age: 32
End: 2017-03-08

## 2017-03-08 LAB
BACTERIA SPEC AEROBE CULT: NORMAL
BACTERIA SPEC AEROBE CULT: NORMAL
ETHANOL UR-MCNC: NEGATIVE %
ETHANOL UR-MCNC: NEGATIVE %
GRAM STN SPEC: NORMAL

## 2017-03-08 NOTE — PROGRESS NOTES
[place] [address]  [phone]     NAME:     Elena Joshi  MEDICAL RECORD NUMBER:  [id number]   VISIT NUMBER:   None  ATTENDING PHYSICIAN: Tabitha Bah MD   THERAPIST:    Rabia Mckenzie [facility], [facility], [facility]  PRIMARY CARE PROVIDER:  No Known Provider  DATE OF ADMISSION:   02/06/2017  DATE OF DISCHARGE:   03/07/2017  YOB: 1985   REASON FOR ADMISSION: The patient was a voluntary admit to the [facility] IOP program for assistance with opioid, methamphetamine, and alcohol dependencies. The patient was referred to the [facility] IOP program by the [facility]   SUMMARY OF CARE, TREATMENT, AND SERVICES PROVIDED: Patient began Phase 1 of the IOP program on 02/06/2017 and attended 12 group therapy sessions.  She was absent on 1 occasions prior to discharge.  Her DASES score failed to increase from 85 at admission to discharge, which indicated no change in confidence in her ability to maintain abstinence.  She displayed positive for benzodiazepines on a urine drug screen dated 02/22/17, methamphetamine on 2/27/17, 03/01/17, 03/02/17, and 03/07/17, and THC on 02/27/17.  She was offered full sober support to assist with her recovery efforts while in the program.  Patient agreed to a referral to a 30 day residential treatment program to address her ongoing relapse issues.  She presented on on 03/07/2017 for a discharge, psychotherapy appointment to address and resolve any issues which may harm her recovery efforts while in residential treatment.  Patient's scheduled admit time at [facility] in [place], [place] is on 3/8/17 @ 3:00 pm.  Patient will be re-evaluated for IOP appropriateness when she is discharge from treatment. Patient is conditionally discharged from the [facility] IOP.    INTENSIVE OUTPATIENT PROGRAM  DISCHARGE SUMMARY  NAME:  Elena Joshi      ROOM NUMBER:   N/A  MEDICAL RECORD NUMBER: [id number]     Page 1 of  2    [facility]  INTENSIVE OUTPATIENT PROGRAM  DISCHARGE SUMMARY  DISCHARGE RECOMMENDATIONS AND REFERRALS: Patient is Conditionally Discharged from the [facility] AND [facility] program and will be re-evaluated for admission appropriateness when discharged from [facility].  Patient will be FULLY DISCHARGED from BOTH programs if she is not re-assessed in 30 days.  DISCHARGE MEDICATIONS:   naltrexone (DEPADE) 50 MG tablet        Sig: Take 1 tablet by mouth Daily.          rOPINIRole (REQUIP) 0.25 MG tablet        Sig: Take 1 tablet by mouth Every Night. Take 1 hour before bedtime.          PROGNOSIS: Fair with Ongoing Treatment  DISCHARGE DIAGNOSES:    Opioid type dependence, unspecified (F11.20)  Methamphetamine dependence (F15.20)  Sedative, hypnotic or anxiolytic abuse, unspecified (F13.10)  Cannabis dependence (F12.20)  Adjustment disorder with mixed disturbance of emotions and conduct (F43.25)    cc:  No Known Provider    Physician Signature: ________________________________________  Therapist Signature: ________________________________________  Behavioral Health Director: __________________________________  INTENSIVE OUTPATIENT PROGRAM  DISCHARGE SUMMARY  NAME:  Elena Joshi      ROOM NUMBER:   N/A  MEDICAL RECORD NUMBER: [id number]      Page 2 of 2

## 2017-03-09 ENCOUNTER — APPOINTMENT (OUTPATIENT)
Dept: PSYCHIATRY | Facility: HOSPITAL | Age: 32
End: 2017-03-09

## 2017-03-09 LAB — MITRAGYNINE+7-OH UR QL CFM: NEGATIVE

## 2017-03-13 ENCOUNTER — APPOINTMENT (OUTPATIENT)
Dept: PSYCHIATRY | Facility: HOSPITAL | Age: 32
End: 2017-03-13

## 2017-03-15 ENCOUNTER — APPOINTMENT (OUTPATIENT)
Dept: PSYCHIATRY | Facility: HOSPITAL | Age: 32
End: 2017-03-15

## 2017-03-16 ENCOUNTER — APPOINTMENT (OUTPATIENT)
Dept: PSYCHIATRY | Facility: HOSPITAL | Age: 32
End: 2017-03-16

## 2017-03-20 ENCOUNTER — APPOINTMENT (OUTPATIENT)
Dept: PSYCHIATRY | Facility: HOSPITAL | Age: 32
End: 2017-03-20

## 2017-03-22 ENCOUNTER — APPOINTMENT (OUTPATIENT)
Dept: PSYCHIATRY | Facility: HOSPITAL | Age: 32
End: 2017-03-22

## 2017-03-23 ENCOUNTER — APPOINTMENT (OUTPATIENT)
Dept: PSYCHIATRY | Facility: HOSPITAL | Age: 32
End: 2017-03-23

## 2017-03-27 ENCOUNTER — APPOINTMENT (OUTPATIENT)
Dept: PSYCHIATRY | Facility: HOSPITAL | Age: 32
End: 2017-03-27

## 2017-03-29 ENCOUNTER — APPOINTMENT (OUTPATIENT)
Dept: PSYCHIATRY | Facility: HOSPITAL | Age: 32
End: 2017-03-29

## 2017-03-30 ENCOUNTER — APPOINTMENT (OUTPATIENT)
Dept: PSYCHIATRY | Facility: HOSPITAL | Age: 32
End: 2017-03-30

## 2017-04-03 ENCOUNTER — APPOINTMENT (OUTPATIENT)
Dept: PSYCHIATRY | Facility: HOSPITAL | Age: 32
End: 2017-04-03

## 2017-04-05 ENCOUNTER — APPOINTMENT (OUTPATIENT)
Dept: PSYCHIATRY | Facility: HOSPITAL | Age: 32
End: 2017-04-05

## 2017-04-06 ENCOUNTER — APPOINTMENT (OUTPATIENT)
Dept: PSYCHIATRY | Facility: HOSPITAL | Age: 32
End: 2017-04-06

## 2017-04-10 ENCOUNTER — APPOINTMENT (OUTPATIENT)
Dept: PSYCHIATRY | Facility: HOSPITAL | Age: 32
End: 2017-04-10

## 2017-04-12 ENCOUNTER — APPOINTMENT (OUTPATIENT)
Dept: PSYCHIATRY | Facility: HOSPITAL | Age: 32
End: 2017-04-12

## 2017-04-13 ENCOUNTER — APPOINTMENT (OUTPATIENT)
Dept: PSYCHIATRY | Facility: HOSPITAL | Age: 32
End: 2017-04-13

## 2017-04-17 ENCOUNTER — APPOINTMENT (OUTPATIENT)
Dept: PSYCHIATRY | Facility: HOSPITAL | Age: 32
End: 2017-04-17

## 2017-08-29 ENCOUNTER — HOSPITAL ENCOUNTER (OUTPATIENT)
Dept: GENERAL RADIOLOGY | Facility: HOSPITAL | Age: 32
Discharge: HOME | End: 2017-08-29
Attending: ORTHOPAEDIC SURGERY | Admitting: ORTHOPAEDIC SURGERY

## 2017-08-29 ENCOUNTER — OFFICE VISIT (OUTPATIENT)
Dept: ORTHOPEDIC SURGERY | Facility: CLINIC | Age: 32
End: 2017-08-29

## 2017-08-29 VITALS
BODY MASS INDEX: 45.83 KG/M2 | SYSTOLIC BLOOD PRESSURE: 130 MMHG | HEIGHT: 67 IN | DIASTOLIC BLOOD PRESSURE: 81 MMHG | WEIGHT: 292 LBS | HEART RATE: 79 BPM

## 2017-08-29 DIAGNOSIS — M25.561 PATELLOFEMORAL JOINT PAIN, RIGHT: Primary | ICD-10-CM

## 2017-08-29 DIAGNOSIS — M25.561 RIGHT KNEE PAIN, UNSPECIFIED CHRONICITY: ICD-10-CM

## 2017-08-29 PROBLEM — G89.29 CHRONIC PAIN OF RIGHT KNEE: Status: ACTIVE | Noted: 2017-08-29

## 2017-08-29 PROCEDURE — 99203 OFFICE O/P NEW LOW 30 MIN: CPT | Performed by: ORTHOPAEDIC SURGERY

## 2017-08-29 PROCEDURE — 73562 X-RAY EXAM OF KNEE 3: CPT

## 2017-08-29 PROCEDURE — 20610 DRAIN/INJ JOINT/BURSA W/O US: CPT | Performed by: ORTHOPAEDIC SURGERY

## 2017-08-29 PROCEDURE — 73562 X-RAY EXAM OF KNEE 3: CPT | Performed by: RADIOLOGY

## 2017-08-29 RX ORDER — METHYLPREDNISOLONE ACETATE 40 MG/ML
80 INJECTION, SUSPENSION INTRA-ARTICULAR; INTRALESIONAL; INTRAMUSCULAR; SOFT TISSUE
Status: COMPLETED | OUTPATIENT
Start: 2017-08-29 | End: 2017-08-29

## 2017-08-29 RX ORDER — BUPIVACAINE HYDROCHLORIDE 5 MG/ML
5 INJECTION, SOLUTION PERINEURAL
Status: COMPLETED | OUTPATIENT
Start: 2017-08-29 | End: 2017-08-29

## 2017-08-29 RX ADMIN — METHYLPREDNISOLONE ACETATE 80 MG: 40 INJECTION, SUSPENSION INTRA-ARTICULAR; INTRALESIONAL; INTRAMUSCULAR; SOFT TISSUE at 11:27

## 2017-08-29 RX ADMIN — BUPIVACAINE HYDROCHLORIDE 5 ML: 5 INJECTION, SOLUTION PERINEURAL at 11:27

## 2017-08-29 NOTE — PROGRESS NOTES
New Patient Visit        Patient: Elena Joshi  YOB: 1985  Date of encounter: 8/29/2017      History of Present Illness:   Elena Joshi is a 31 y.o. female who was referred here today by Dr. Lopez for evaluation of right knee pain.  She states her knee pain started about 2 years ago with no known injury.  She states she continues to get progressively worse with time.  She is complaining of significant anterior knee pain it's worse with range of motion.  She also has difficulty with prolonged ambulation and with stairs.  She states when she first wakes up in the morning her knee will be stiff and painful.  She's been taking over-the-counter ibuprofen for pain.    PMH:   Patient Active Problem List   Diagnosis   • Chronic pain of right knee     Past Medical History:   Diagnosis Date   • Arm fracture     as child        PSH:  Past Surgical History:   Procedure Laterality Date   • NO PAST SURGERIES         Allergies:     Allergies   Allergen Reactions   • Penicillins        Medications:   No current outpatient prescriptions on file.    Social History:  Social History     Social History   • Marital status:      Spouse name: N/A   • Number of children: N/A   • Years of education: N/A     Occupational History   • Not on file.     Social History Main Topics   • Smoking status: Former Smoker     Packs/day: 0.50     Types: Cigarettes   • Smokeless tobacco: Not on file   • Alcohol use No   • Drug use: No   • Sexual activity: Not on file     Other Topics Concern   • Not on file     Social History Narrative       Family History:     Family History   Problem Relation Age of Onset   • Rheumatologic disease Mother    • Hypertension Mother    • Rheumatologic disease Sister    • Rheumatologic disease Maternal Grandmother    • Hypertension Maternal Grandmother    • Heart disease Maternal Grandmother    • Rheumatologic disease Maternal Grandfather        Review of Systems:   Review of Systems  "  Constitutional: Positive for fatigue.   HENT: Negative.    Eyes: Negative.    Respiratory: Negative.    Cardiovascular: Positive for leg swelling.   Gastrointestinal: Negative.    Endocrine: Negative.    Genitourinary: Negative.    Musculoskeletal:        Pertinent positives mentioned in HPI   Skin: Negative.    Neurological: Positive for weakness and numbness.   Hematological: Negative.    Psychiatric/Behavioral: Positive for sleep disturbance.       Physical Exam: 31 y.o. female  General Appearance:    Alert and oriented x 3, cooperative, in no acute distress                   Vitals:    08/29/17 0858   BP: 130/81   Pulse: 79   Weight: 292 lb (132 kg)   Height: 67\" (170.2 cm)                Musculoskeletal: Examination of the right knee reveals no effusion.  She is no joint line tenderness.  She has full range of motion with moderate crepitus of the patella.  No instability with varus or valgus stressing.  Lachman and drawer negative.  Neurovascular status is intact.    Radiology:     3 views of the right knee show the bony structures of the knee  to be intact. There was no evidence of acute fracture or dislocation. There is some mild joint space narrowing slightly more prominent in the lateral joint compartment. There is narrowing of the patellofemoral joint with mild osteophyte formation. There were no plain film findings of joint effusion.      Large Joint Arthrocentesis  Date/Time: 8/29/2017 11:27 AM  Consent given by: patient  Timeout: Immediately prior to procedure a time out was called to verify the correct patient, procedure, equipment, support staff and site/side marked as required   Supporting Documentation  Indications: pain   Procedure Details  Location: knee - R knee  Needle size: 25 G  Approach: lateral  Medications administered: 80 mg methylPREDNISolone acetate 40 MG/ML; 5 mL bupivacaine  Patient tolerance: patient tolerated the procedure well with no immediate complications          Assessment    " ICD-10-CM ICD-9-CM   1. Patellofemoral joint pain, right M25.561 719.46       Plan:   A 31-year-old female with complaints of right knee pain.  X-rays were reviewed revealing early degenerative changes.  Joint space is still well maintained.  Her complaints are more consistent with patellofemoral pain.  Would like to get her started in an anterior knee program with formal physical therapy over the next 6 weeks.  We also proceeded with 80 mg Depo-Medrol with Marcaine block intra-articular into the right knee.  She tolerated the procedure well.  She'll return back in 7 weeks for reevaluation.    Written by, Eliz NAVARRETE, acting as a scribe for Dr. Yaneth Lopez

## 2020-07-20 PROCEDURE — U0003 INFECTIOUS AGENT DETECTION BY NUCLEIC ACID (DNA OR RNA); SEVERE ACUTE RESPIRATORY SYNDROME CORONAVIRUS 2 (SARS-COV-2) (CORONAVIRUS DISEASE [COVID-19]), AMPLIFIED PROBE TECHNIQUE, MAKING USE OF HIGH THROUGHPUT TECHNOLOGIES AS DESCRIBED BY CMS-2020-01-R: HCPCS | Performed by: PHYSICIAN ASSISTANT
